# Patient Record
Sex: FEMALE | Race: WHITE | NOT HISPANIC OR LATINO | Employment: UNEMPLOYED | URBAN - METROPOLITAN AREA
[De-identification: names, ages, dates, MRNs, and addresses within clinical notes are randomized per-mention and may not be internally consistent; named-entity substitution may affect disease eponyms.]

---

## 2017-04-28 ENCOUNTER — HOSPITAL ENCOUNTER (EMERGENCY)
Facility: HOSPITAL | Age: 27
End: 2017-05-02
Attending: EMERGENCY MEDICINE | Admitting: EMERGENCY MEDICINE
Payer: COMMERCIAL

## 2017-04-28 ENCOUNTER — APPOINTMENT (EMERGENCY)
Dept: RADIOLOGY | Facility: HOSPITAL | Age: 27
End: 2017-04-28
Payer: COMMERCIAL

## 2017-04-28 DIAGNOSIS — F19.94 SUBSTANCE INDUCED MOOD DISORDER (HCC): Primary | ICD-10-CM

## 2017-04-28 LAB
ALBUMIN SERPL BCP-MCNC: 4.1 G/DL (ref 3.5–5)
ALP SERPL-CCNC: 56 U/L (ref 46–116)
ALT SERPL W P-5'-P-CCNC: 44 U/L (ref 12–78)
AMPHETAMINES SERPL QL SCN: NEGATIVE
ANION GAP SERPL CALCULATED.3IONS-SCNC: 17 MMOL/L (ref 4–13)
APTT PPP: 27 SECONDS (ref 23–35)
AST SERPL W P-5'-P-CCNC: 78 U/L (ref 5–45)
BARBITURATES UR QL: NEGATIVE
BASOPHILS # BLD AUTO: 0 THOUSANDS/ΜL (ref 0–0.1)
BASOPHILS NFR BLD AUTO: 0 % (ref 0–1)
BENZODIAZ UR QL: NEGATIVE
BILIRUB DIRECT SERPL-MCNC: 0.1 MG/DL (ref 0–0.2)
BILIRUB SERPL-MCNC: 0.3 MG/DL (ref 0.2–1)
BILIRUB UR QL STRIP: NEGATIVE
BUN SERPL-MCNC: 8 MG/DL (ref 5–25)
CALCIUM SERPL-MCNC: 8.7 MG/DL (ref 8.3–10.1)
CHLORIDE SERPL-SCNC: 103 MMOL/L (ref 100–108)
CLARITY UR: CLEAR
CO2 SERPL-SCNC: 22 MMOL/L (ref 21–32)
COCAINE UR QL: POSITIVE
COLOR UR: ABNORMAL
CREAT SERPL-MCNC: 0.68 MG/DL (ref 0.6–1.3)
EOSINOPHIL # BLD AUTO: 0 THOUSAND/ΜL (ref 0–0.61)
EOSINOPHIL NFR BLD AUTO: 0 % (ref 0–6)
ERYTHROCYTE [DISTWIDTH] IN BLOOD BY AUTOMATED COUNT: 14.5 % (ref 11.6–15.1)
ETHANOL SERPL-MCNC: 253 MG/DL (ref 0–3)
GFR SERPL CREATININE-BSD FRML MDRD: >60 ML/MIN/1.73SQ M
GLUCOSE SERPL-MCNC: 81 MG/DL (ref 65–140)
GLUCOSE UR STRIP-MCNC: NEGATIVE MG/DL
HCG UR QL: NORMAL
HCT VFR BLD AUTO: 40.7 % (ref 37–47)
HGB BLD-MCNC: 13.4 G/DL (ref 12–16)
HGB UR QL STRIP.AUTO: ABNORMAL
INR PPP: 1.05 (ref 0.86–1.16)
KETONES UR STRIP-MCNC: NEGATIVE MG/DL
LEUKOCYTE ESTERASE UR QL STRIP: NEGATIVE
LIPASE SERPL-CCNC: 80 U/L (ref 73–393)
LYMPHOCYTES # BLD AUTO: 2.4 THOUSANDS/ΜL (ref 0.6–4.47)
LYMPHOCYTES NFR BLD AUTO: 20 % (ref 14–44)
MCH RBC QN AUTO: 30.3 PG (ref 27–31)
MCHC RBC AUTO-ENTMCNC: 32.8 G/DL (ref 31.4–37.4)
MCV RBC AUTO: 92 FL (ref 82–98)
METHADONE UR QL: NEGATIVE
MONOCYTES # BLD AUTO: 1 THOUSAND/ΜL (ref 0.17–1.22)
MONOCYTES NFR BLD AUTO: 8 % (ref 4–12)
NEUTROPHILS # BLD AUTO: 8.9 THOUSANDS/ΜL (ref 1.85–7.62)
NEUTS SEG NFR BLD AUTO: 72 % (ref 43–75)
NITRITE UR QL STRIP: NEGATIVE
NON-SQ EPI CELLS URNS QL MICRO: ABNORMAL /HPF
NRBC BLD AUTO-RTO: 0 /100 WBCS
OPIATES UR QL SCN: NEGATIVE
PCP UR QL: NEGATIVE
PH UR STRIP.AUTO: 5.5 [PH] (ref 5–9)
PLATELET # BLD AUTO: 355 THOUSANDS/UL (ref 130–400)
PMV BLD AUTO: 6.8 FL (ref 8.9–12.7)
POTASSIUM SERPL-SCNC: 2.9 MMOL/L (ref 3.5–5.3)
PROT SERPL-MCNC: 7.7 G/DL (ref 6.4–8.2)
PROT UR STRIP-MCNC: NEGATIVE MG/DL
PROTHROMBIN TIME: 11 SECONDS (ref 9.4–11.7)
RBC # BLD AUTO: 4.42 MILLION/UL (ref 4.2–5.4)
RBC #/AREA URNS AUTO: ABNORMAL /HPF
SODIUM SERPL-SCNC: 142 MMOL/L (ref 136–145)
SP GR UR STRIP.AUTO: <=1.005 (ref 1–1.03)
THC UR QL: POSITIVE
UROBILINOGEN UR QL STRIP.AUTO: 0.2 E.U./DL
WBC # BLD AUTO: 12.4 THOUSAND/UL (ref 4.8–10.8)
WBC #/AREA URNS AUTO: ABNORMAL /HPF

## 2017-04-28 PROCEDURE — 80048 BASIC METABOLIC PNL TOTAL CA: CPT | Performed by: EMERGENCY MEDICINE

## 2017-04-28 PROCEDURE — 80320 DRUG SCREEN QUANTALCOHOLS: CPT | Performed by: EMERGENCY MEDICINE

## 2017-04-28 PROCEDURE — 85610 PROTHROMBIN TIME: CPT | Performed by: EMERGENCY MEDICINE

## 2017-04-28 PROCEDURE — 83690 ASSAY OF LIPASE: CPT | Performed by: EMERGENCY MEDICINE

## 2017-04-28 PROCEDURE — 80076 HEPATIC FUNCTION PANEL: CPT | Performed by: EMERGENCY MEDICINE

## 2017-04-28 PROCEDURE — 72125 CT NECK SPINE W/O DYE: CPT

## 2017-04-28 PROCEDURE — 96365 THER/PROPH/DIAG IV INF INIT: CPT

## 2017-04-28 PROCEDURE — 70450 CT HEAD/BRAIN W/O DYE: CPT

## 2017-04-28 PROCEDURE — 85730 THROMBOPLASTIN TIME PARTIAL: CPT | Performed by: EMERGENCY MEDICINE

## 2017-04-28 PROCEDURE — 81025 URINE PREGNANCY TEST: CPT | Performed by: EMERGENCY MEDICINE

## 2017-04-28 PROCEDURE — 80307 DRUG TEST PRSMV CHEM ANLYZR: CPT | Performed by: EMERGENCY MEDICINE

## 2017-04-28 PROCEDURE — 36415 COLL VENOUS BLD VENIPUNCTURE: CPT | Performed by: EMERGENCY MEDICINE

## 2017-04-28 PROCEDURE — 96372 THER/PROPH/DIAG INJ SC/IM: CPT

## 2017-04-28 PROCEDURE — 71260 CT THORAX DX C+: CPT

## 2017-04-28 PROCEDURE — 81001 URINALYSIS AUTO W/SCOPE: CPT | Performed by: EMERGENCY MEDICINE

## 2017-04-28 PROCEDURE — 85025 COMPLETE CBC W/AUTO DIFF WBC: CPT | Performed by: EMERGENCY MEDICINE

## 2017-04-28 PROCEDURE — 96361 HYDRATE IV INFUSION ADD-ON: CPT

## 2017-04-28 PROCEDURE — 74177 CT ABD & PELVIS W/CONTRAST: CPT

## 2017-04-28 RX ORDER — HALOPERIDOL 5 MG/ML
5 INJECTION INTRAMUSCULAR ONCE
Status: COMPLETED | OUTPATIENT
Start: 2017-04-28 | End: 2017-04-28

## 2017-04-28 RX ORDER — POTASSIUM CHLORIDE 14.9 MG/ML
20 INJECTION INTRAVENOUS ONCE
Status: COMPLETED | OUTPATIENT
Start: 2017-04-28 | End: 2017-04-29

## 2017-04-28 RX ORDER — LORAZEPAM 2 MG/ML
2 INJECTION INTRAMUSCULAR ONCE
Status: COMPLETED | OUTPATIENT
Start: 2017-04-28 | End: 2017-04-28

## 2017-04-28 RX ADMIN — POTASSIUM CHLORIDE 20 MEQ: 200 INJECTION, SOLUTION INTRAVENOUS at 23:20

## 2017-04-28 RX ADMIN — IOHEXOL 100 ML: 350 INJECTION, SOLUTION INTRAVENOUS at 19:49

## 2017-04-28 RX ADMIN — LORAZEPAM 2 MG: 2 INJECTION INTRAMUSCULAR; INTRAVENOUS at 17:57

## 2017-04-28 RX ADMIN — SODIUM CHLORIDE 125 ML/HR: 0.9 INJECTION, SOLUTION INTRAVENOUS at 23:30

## 2017-04-28 RX ADMIN — SODIUM CHLORIDE 1000 ML: 0.9 INJECTION, SOLUTION INTRAVENOUS at 18:57

## 2017-04-28 RX ADMIN — HALOPERIDOL LACTATE 5 MG: 5 INJECTION, SOLUTION INTRAMUSCULAR at 17:57

## 2017-04-29 PROCEDURE — 96366 THER/PROPH/DIAG IV INF ADDON: CPT

## 2017-04-29 RX ORDER — SODIUM CHLORIDE 9 MG/ML
1000 INJECTION, SOLUTION INTRAVENOUS ONCE
Status: DISCONTINUED | OUTPATIENT
Start: 2017-04-29 | End: 2017-04-29

## 2017-04-29 RX ORDER — CLONAZEPAM 0.5 MG/1
1 TABLET ORAL 3 TIMES DAILY
Status: DISCONTINUED | OUTPATIENT
Start: 2017-04-29 | End: 2017-05-02 | Stop reason: HOSPADM

## 2017-04-29 RX ORDER — SODIUM CHLORIDE 9 MG/ML
125 INJECTION, SOLUTION INTRAVENOUS CONTINUOUS
Status: DISCONTINUED | OUTPATIENT
Start: 2017-04-29 | End: 2017-05-02 | Stop reason: HOSPADM

## 2017-04-29 RX ORDER — SERTRALINE HYDROCHLORIDE 100 MG/1
150 TABLET, FILM COATED ORAL DAILY
COMMUNITY
End: 2018-05-18

## 2017-04-29 RX ORDER — GABAPENTIN 300 MG/1
600 CAPSULE ORAL 3 TIMES DAILY
COMMUNITY
End: 2019-03-11 | Stop reason: DRUGHIGH

## 2017-04-29 RX ORDER — CLONAZEPAM 1 MG/1
1 TABLET ORAL 3 TIMES DAILY
COMMUNITY
End: 2018-05-18

## 2017-04-29 RX ORDER — POTASSIUM CHLORIDE 20 MEQ/1
40 TABLET, EXTENDED RELEASE ORAL ONCE
Status: COMPLETED | OUTPATIENT
Start: 2017-04-29 | End: 2017-04-29

## 2017-04-29 RX ADMIN — CLONAZEPAM 1 MG: 0.5 TABLET ORAL at 09:51

## 2017-04-29 RX ADMIN — CLONAZEPAM 1 MG: 0.5 TABLET ORAL at 21:01

## 2017-04-29 RX ADMIN — GABAPENTIN 800 MG: 300 CAPSULE ORAL at 09:52

## 2017-04-29 RX ADMIN — GABAPENTIN 800 MG: 300 CAPSULE ORAL at 21:02

## 2017-04-29 RX ADMIN — GABAPENTIN 800 MG: 300 CAPSULE ORAL at 16:24

## 2017-04-29 RX ADMIN — POTASSIUM CHLORIDE 40 MEQ: 1500 TABLET, EXTENDED RELEASE ORAL at 09:51

## 2017-04-29 RX ADMIN — SERTRALINE HYDROCHLORIDE 150 MG: 50 TABLET ORAL at 09:51

## 2017-04-29 RX ADMIN — CLONAZEPAM 1 MG: 0.5 TABLET ORAL at 16:24

## 2017-04-30 RX ORDER — NICOTINE 21 MG/24HR
21 PATCH, TRANSDERMAL 24 HOURS TRANSDERMAL ONCE
Status: COMPLETED | OUTPATIENT
Start: 2017-04-30 | End: 2017-05-02

## 2017-04-30 RX ORDER — ZOLPIDEM TARTRATE 5 MG/1
5 TABLET ORAL
Status: DISCONTINUED | OUTPATIENT
Start: 2017-04-30 | End: 2017-05-02 | Stop reason: HOSPADM

## 2017-04-30 RX ORDER — LORAZEPAM 1 MG/1
1 TABLET ORAL ONCE
Status: COMPLETED | OUTPATIENT
Start: 2017-04-30 | End: 2017-04-30

## 2017-04-30 RX ORDER — NICOTINE 21 MG/24HR
14 PATCH, TRANSDERMAL 24 HOURS TRANSDERMAL ONCE
Status: COMPLETED | OUTPATIENT
Start: 2017-04-30 | End: 2017-05-01

## 2017-04-30 RX ADMIN — GABAPENTIN 800 MG: 300 CAPSULE ORAL at 09:22

## 2017-04-30 RX ADMIN — SERTRALINE HYDROCHLORIDE 150 MG: 50 TABLET ORAL at 09:22

## 2017-04-30 RX ADMIN — ZOLPIDEM TARTRATE 5 MG: 5 TABLET, COATED ORAL at 23:09

## 2017-04-30 RX ADMIN — CLONAZEPAM 1 MG: 0.5 TABLET ORAL at 15:48

## 2017-04-30 RX ADMIN — GABAPENTIN 800 MG: 300 CAPSULE ORAL at 20:22

## 2017-04-30 RX ADMIN — GABAPENTIN 800 MG: 300 CAPSULE ORAL at 16:07

## 2017-04-30 RX ADMIN — NICOTINE 14 MG: 14 PATCH TRANSDERMAL at 18:05

## 2017-04-30 RX ADMIN — NICOTINE 21 MG: 21 PATCH, EXTENDED RELEASE TRANSDERMAL at 14:37

## 2017-04-30 RX ADMIN — CLONAZEPAM 1 MG: 0.5 TABLET ORAL at 20:22

## 2017-04-30 RX ADMIN — CLONAZEPAM 1 MG: 0.5 TABLET ORAL at 09:22

## 2017-04-30 RX ADMIN — LORAZEPAM 1 MG: 1 TABLET ORAL at 20:22

## 2017-04-30 RX ADMIN — LORAZEPAM 1 MG: 1 TABLET ORAL at 22:08

## 2017-05-01 VITALS
WEIGHT: 145 LBS | DIASTOLIC BLOOD PRESSURE: 63 MMHG | OXYGEN SATURATION: 99 % | SYSTOLIC BLOOD PRESSURE: 112 MMHG | HEART RATE: 71 BPM | RESPIRATION RATE: 18 BRPM | TEMPERATURE: 98.3 F

## 2017-05-01 LAB — POTASSIUM SERPL-SCNC: 4 MMOL/L (ref 3.5–5.3)

## 2017-05-01 PROCEDURE — 84132 ASSAY OF SERUM POTASSIUM: CPT | Performed by: EMERGENCY MEDICINE

## 2017-05-01 PROCEDURE — 93005 ELECTROCARDIOGRAM TRACING: CPT | Performed by: EMERGENCY MEDICINE

## 2017-05-01 PROCEDURE — 36415 COLL VENOUS BLD VENIPUNCTURE: CPT | Performed by: EMERGENCY MEDICINE

## 2017-05-01 RX ORDER — LORAZEPAM 1 MG/1
2 TABLET ORAL ONCE
Status: COMPLETED | OUTPATIENT
Start: 2017-05-01 | End: 2017-05-01

## 2017-05-01 RX ORDER — HYDROXYZINE HYDROCHLORIDE 25 MG/1
50 TABLET, FILM COATED ORAL ONCE
Status: COMPLETED | OUTPATIENT
Start: 2017-05-01 | End: 2017-05-01

## 2017-05-01 RX ORDER — NICOTINE 21 MG/24HR
21 PATCH, TRANSDERMAL 24 HOURS TRANSDERMAL ONCE
Status: DISCONTINUED | OUTPATIENT
Start: 2017-05-01 | End: 2017-05-02 | Stop reason: HOSPADM

## 2017-05-01 RX ORDER — HYDROXYZINE HYDROCHLORIDE 50 MG/ML
50 INJECTION, SOLUTION INTRAMUSCULAR ONCE
Status: DISCONTINUED | OUTPATIENT
Start: 2017-05-01 | End: 2017-05-01

## 2017-05-01 RX ADMIN — NICOTINE 21 MG: 21 PATCH, EXTENDED RELEASE TRANSDERMAL at 09:14

## 2017-05-01 RX ADMIN — GABAPENTIN 800 MG: 300 CAPSULE ORAL at 15:42

## 2017-05-01 RX ADMIN — CLONAZEPAM 1 MG: 0.5 TABLET ORAL at 15:43

## 2017-05-01 RX ADMIN — CLONAZEPAM 1 MG: 0.5 TABLET ORAL at 20:46

## 2017-05-01 RX ADMIN — SERTRALINE HYDROCHLORIDE 150 MG: 50 TABLET ORAL at 08:39

## 2017-05-01 RX ADMIN — CLONAZEPAM 1 MG: 0.5 TABLET ORAL at 08:06

## 2017-05-01 RX ADMIN — GABAPENTIN 800 MG: 300 CAPSULE ORAL at 20:46

## 2017-05-01 RX ADMIN — LORAZEPAM 2 MG: 1 TABLET ORAL at 18:27

## 2017-05-01 RX ADMIN — LORAZEPAM 2 MG: 1 TABLET ORAL at 05:01

## 2017-05-01 RX ADMIN — GABAPENTIN 800 MG: 300 CAPSULE ORAL at 08:05

## 2017-05-01 RX ADMIN — NICOTINE 21 MG: 21 PATCH, EXTENDED RELEASE TRANSDERMAL at 09:19

## 2017-05-01 RX ADMIN — ZOLPIDEM TARTRATE 5 MG: 5 TABLET, COATED ORAL at 22:56

## 2017-05-01 RX ADMIN — HYDROXYZINE HYDROCHLORIDE 50 MG: 25 TABLET, FILM COATED ORAL at 10:56

## 2017-05-02 PROCEDURE — 99285 EMERGENCY DEPT VISIT HI MDM: CPT

## 2017-05-05 LAB
ATRIAL RATE: 66 BPM
P AXIS: -1 DEGREES
PR INTERVAL: 140 MS
QRS AXIS: 63 DEGREES
QRSD INTERVAL: 92 MS
QT INTERVAL: 398 MS
QTC INTERVAL: 417 MS
T WAVE AXIS: 39 DEGREES
VENTRICULAR RATE: 66 BPM

## 2018-05-18 ENCOUNTER — HOSPITAL ENCOUNTER (EMERGENCY)
Facility: HOSPITAL | Age: 28
Discharge: HOME | End: 2018-05-18
Attending: EMERGENCY MEDICINE | Admitting: EMERGENCY MEDICINE
Payer: COMMERCIAL

## 2018-05-18 VITALS
HEART RATE: 58 BPM | WEIGHT: 170 LBS | BODY MASS INDEX: 27.32 KG/M2 | DIASTOLIC BLOOD PRESSURE: 72 MMHG | OXYGEN SATURATION: 92 % | SYSTOLIC BLOOD PRESSURE: 109 MMHG | HEIGHT: 66 IN | TEMPERATURE: 97.5 F | RESPIRATION RATE: 16 BRPM

## 2018-05-18 DIAGNOSIS — T50.901A DRUG OVERDOSE: Primary | ICD-10-CM

## 2018-05-18 LAB
ATRIAL RATE: 69 BPM
P AXIS: 55 DEGREES
PR INTERVAL: 166 MS
QRS AXIS: 37 DEGREES
QRSD INTERVAL: 88 MS
QT INTERVAL: 422 MS
QTC INTERVAL: 452 MS
T WAVE AXIS: 34 DEGREES
VENTRICULAR RATE: 69 BPM

## 2018-05-18 PROCEDURE — 93010 ELECTROCARDIOGRAM REPORT: CPT | Performed by: INTERNAL MEDICINE

## 2018-05-18 PROCEDURE — 96375 TX/PRO/DX INJ NEW DRUG ADDON: CPT

## 2018-05-18 PROCEDURE — 93005 ELECTROCARDIOGRAM TRACING: CPT

## 2018-05-18 PROCEDURE — 96374 THER/PROPH/DIAG INJ IV PUSH: CPT

## 2018-05-18 PROCEDURE — 99285 EMERGENCY DEPT VISIT HI MDM: CPT

## 2018-05-18 PROCEDURE — 96376 TX/PRO/DX INJ SAME DRUG ADON: CPT

## 2018-05-18 RX ORDER — CHLORPROMAZINE HYDROCHLORIDE 200 MG/1
200 TABLET, FILM COATED ORAL
COMMUNITY

## 2018-05-18 RX ORDER — NALOXONE HYDROCHLORIDE 0.4 MG/ML
1 INJECTION, SOLUTION INTRAMUSCULAR; INTRAVENOUS; SUBCUTANEOUS ONCE
Status: COMPLETED | OUTPATIENT
Start: 2018-05-18 | End: 2018-05-18

## 2018-05-18 RX ORDER — NALOXONE HYDROCHLORIDE 0.4 MG/ML
0.4 INJECTION, SOLUTION INTRAMUSCULAR; INTRAVENOUS; SUBCUTANEOUS ONCE
Status: COMPLETED | OUTPATIENT
Start: 2018-05-18 | End: 2018-05-18

## 2018-05-18 RX ORDER — ONDANSETRON 2 MG/ML
4 INJECTION INTRAMUSCULAR; INTRAVENOUS ONCE
Status: COMPLETED | OUTPATIENT
Start: 2018-05-18 | End: 2018-05-18

## 2018-05-18 RX ORDER — HYDROXYZINE PAMOATE 50 MG/1
50 CAPSULE ORAL EVERY EVENING
COMMUNITY
End: 2019-03-11 | Stop reason: ALTCHOICE

## 2018-05-18 RX ORDER — VENLAFAXINE 100 MG/1
175 TABLET ORAL DAILY
COMMUNITY
End: 2018-07-11

## 2018-05-18 RX ORDER — TRAZODONE HYDROCHLORIDE 100 MG/1
200 TABLET ORAL
COMMUNITY

## 2018-05-18 RX ORDER — NALOXONE HYDROCHLORIDE 0.4 MG/ML
0.4 INJECTION, SOLUTION INTRAMUSCULAR; INTRAVENOUS; SUBCUTANEOUS ONCE
Status: DISCONTINUED | OUTPATIENT
Start: 2018-05-18 | End: 2018-05-18 | Stop reason: HOSPADM

## 2018-05-18 RX ADMIN — NALOXONE HYDROCHLORIDE 0.4 MG: 0.4 INJECTION, SOLUTION INTRAMUSCULAR; INTRAVENOUS; SUBCUTANEOUS at 16:51

## 2018-05-18 RX ADMIN — ONDANSETRON 4 MG: 2 INJECTION INTRAMUSCULAR; INTRAVENOUS at 16:51

## 2018-05-18 RX ADMIN — NALOXONE HYDROCHLORIDE 0.4 MG: 0.4 INJECTION, SOLUTION INTRAMUSCULAR; INTRAVENOUS; SUBCUTANEOUS at 14:20

## 2018-05-18 NOTE — ED PROVIDER NOTES
History  Chief Complaint   Patient presents with    Heroin Overdose - Accidental     States she was clean for 11 months and went to her first intake appointment today and someone offered her free heroin so she used 2 bags   Per medic patient responded after police gave Narcan intranasally, medic attempted one IV stick but was unsuccessful and patient refused further attempts, asking to leave on arrival        Patient presents after overdose on heroin  Patient was revived with nasal narcan by police  Patient was clean for 11 months was at rehab today when someone offered her free heroin  She took two bags  No suicidal or homicidal ideations  History provided by:  Patient   used: No        Prior to Admission Medications   Prescriptions Last Dose Informant Patient Reported? Taking?   chlorproMAZINE (THORAZINE) 200 mg tablet   Yes Yes   Sig: Take 200 mg by mouth Medrol Dose Pack scheduling ONLY   gabapentin (NEURONTIN) 300 mg capsule  Self Yes No   Sig: Take 600 mg by mouth 3 (three) times a day     hydrOXYzine pamoate (VISTARIL) 50 mg capsule  Self Yes Yes   Sig: Take 50 mg by mouth every evening   traZODone (DESYREL) 100 mg tablet   Yes Yes   Sig: Take 200 mg by mouth daily at bedtime   venlafaxine (EFFEXOR) 100 MG tablet  Self Yes Yes   Sig: Take 175 mg by mouth daily      Facility-Administered Medications: None       Past Medical History:   Diagnosis Date    Psychiatric disorder        History reviewed  No pertinent surgical history  History reviewed  No pertinent family history  I have reviewed and agree with the history as documented  Social History   Substance Use Topics    Smoking status: Current Every Day Smoker    Smokeless tobacco: Never Used    Alcohol use Yes        Review of Systems   All other systems reviewed and are negative  Physical Exam  Physical Exam   Constitutional: She is oriented to person, place, and time  No distress     HENT:   Mouth/Throat: Oropharynx is clear and moist    Eyes: Pupils are equal, round, and reactive to light  Neck: Normal range of motion  Cardiovascular: Normal rate, regular rhythm and intact distal pulses  Pulmonary/Chest: Effort normal and breath sounds normal  No respiratory distress  Abdominal: Soft  There is no tenderness  Musculoskeletal: Normal range of motion  Neurological: She is alert and oriented to person, place, and time  Skin: Capillary refill takes less than 2 seconds  She is not diaphoretic  Nursing note and vitals reviewed  Vital Signs  ED Triage Vitals [05/18/18 1402]   Temperature Pulse Respirations Blood Pressure SpO2   97 5 °F (36 4 °C) 64 16 109/59 (!) 85 %      Temp Source Heart Rate Source Patient Position - Orthostatic VS BP Location FiO2 (%)   Oral Monitor Lying Left arm --      Pain Score       No Pain           Vitals:    05/18/18 1445 05/18/18 1500 05/18/18 1515 05/18/18 1530   BP: 117/74 106/66 106/64 121/60   Pulse:    66   Patient Position - Orthostatic VS:           Visual Acuity      ED Medications  Medications    EMS REPLENISHMENT MED (not administered)   naloxone (NARCAN) injection 0 4 mg (not administered)   naloxone (NARCAN) injection 0 4 mg (0 4 mg Intravenous Given 5/18/18 1420)   naloxone (NARCAN) injection 1 mg (0 4 mg Intravenous Given 5/18/18 1651)   ondansetron (ZOFRAN) injection 4 mg (4 mg Intravenous Given 5/18/18 1651)       Diagnostic Studies  Results Reviewed     None                 No orders to display              Procedures  Procedures       Phone Contacts  ED Phone Contact    ED Course                               MDM  Number of Diagnoses or Management Options  Drug overdose:   Diagnosis management comments: Pulse ox 97% on RA indicating adequate oxygenation    Given another dose of narcan on arrival as she was still lethargic with shallow breathing  [facility] came to see the patient   Patient was also screened by [facility] since she was at there program today  Recommended discharge  Will await father who is coming to pick the patient up  Dad at bedside to take the patient at home  Patient Progress  Patient progress: stable    CritCare Time    Disposition  Final diagnoses:   None     ED Disposition     None      Follow-up Information    None         Patient's Medications   Discharge Prescriptions    No medications on file     No discharge procedures on file      ED Provider  Electronically Signed by           Alejandro De Anda DO  05/19/18 7723

## 2018-05-18 NOTE — ED PROCEDURE NOTE
PROCEDURE  ECG 12 Lead Documentation  Date/Time: 5/18/2018 2:19 PM  Performed by: Mercy Rodrigez  Authorized by: Skyler GASPAR     ECG reviewed by me, the ED Provider: yes    Patient location:  ED  Interpretation:     Interpretation: normal    Rate:     ECG rate:  69    ECG rate assessment: normal    Rhythm:     Rhythm: sinus rhythm    Ectopy:     Ectopy: none    QRS:     QRS axis:  Normal    QRS intervals:  Normal  Conduction:     Conduction: normal    ST segments:     ST segments:  Normal  T waves:     T waves: normal           Solis Samuel DO  05/18/18 1419

## 2018-05-18 NOTE — PROGRESS NOTES
Pt is a 31 yo DWF who presented to ER via ambulance and paramedics after injecting 2 bags of heroin at home - pt was given narcan prior to presenting to ER  [facility] requested PES assess this pt who had her intake at the [facility] this morning  Stressors: "boredom" and relapse of heroin today after 11 months of sobriety  Mood is "ok" now and has been recently  Sxs include: energy level is "mild"; anxiety about once per day - "leg goes (shakes); palms are sweaty; pit in stomach; grinds teeth"; etoh use from age 15 with a hx of abuse - last use was about 3 weeks ago - 4 x LOCO's; heroin use from age 16 - used 2 bags today - IV and pt had been clean x 11 months; cannabis use from age 15 with last use yesterday and using about every other day; hx of crack cocaine use from age 16 with last use about 11 months ago  Pt denies: all lethality; psychosis; paranoia; any withdrawal risk; any change with sleep/appetite/concentration  Collateral with pt's father, Jelani Barrier [phone]: "Pt had been doing great until today; pt has been attending program; mood has been cheerful and happy; pt has been helping around the house  Pt was past-out downstairs - turning purple; BF was at home visiting pt - pt was given narcan; pt said she got it at group"

## 2018-05-18 NOTE — ED NOTES
Pt falling asleep easily, attempting to eat a cookie and falling asleep while bringing cookie to her mouth    Wakes easily to verbal stimuli     Ike Posada RN  05/18/18 3493

## 2018-05-19 NOTE — DISCHARGE INSTRUCTIONS
Adult Overdose   WHAT YOU NEED TO KNOW:   An overdose occurs when you take more medicine than is safe to take  An overdose may be mild, or it may be a life-threatening emergency  You may feel drowsy, dizzy, or nauseated, depending on what medicine you took  No specific harm was found to your body as a result of your overdose  Your symptoms have decreased over the last 6 to 12 hours  DISCHARGE INSTRUCTIONS:   Call 911 if you or someone close to you has any of the following symptoms:   · Your face is very pale and clammy to the touch  · Your body is limp or you are unable to speak  · You cannot be awakened  · Your breathing is slower or faster than usual      · Your heart is beating slower than usual     · You feel confused or more tired than usual, or you are sweating more than normal     · Your speech is slurred  · Your fingernails or lips are blue or purple  Return to the emergency department if:   · You have severe nausea and vomiting  · You cannot have a bowel movement or urinate  · Your skin and the whites of your eyes turn yellow  Contact your healthcare provider if:   · You think your medicine is not working  · You have nausea, vomiting, diarrhea, or abdominal cramps  · You have questions or concerns about your medicine  Take your medicine as directed:  Contact your healthcare provider if you think your medicine is not helping or if you have side effects  Do not take more medicine that is prescribed  Keep your medicines in the original containers  Keep a list of the medicines, vitamins, and herbs you take  Include the amounts, and when and why you take them  Do not share your medicine with others  Prevent another overdose:   · Read labels carefully  Read the labels of all the medicines that you take  Never take more than the label says to take  If you have questions, ask your pharmacist or healthcare provider  · Do not drink alcohol    Alcohol increases your risk for another overdose  Alcohol can also hide important symptoms that you need to call your healthcare provider for  · Do not drive or operate machinery  until your healthcare provider says it is okay  These activities may be dangerous after an overdose  · Use caution if you take more than one medicine at a time  Mixing medicines or taking more than one medicine at a time can be dangerous  · Tell your family or friends what medicines you are taking  Talk with them about what to do if you have an overdose  Follow up with your healthcare provider as directed: You may need to see a counselor or psychiatrist  Write down your questions so you remember to ask them during your visits  © 2017 [facility] Information is for End User's use only and may not be sold, redistributed or otherwise used for commercial purposes  All illustrations and images included in CareNotes® are the copyrighted property of [employer]  or [employer]  The above information is an  only  It is not intended as medical advice for individual conditions or treatments  Talk to your doctor, nurse or pharmacist before following any medical regimen to see if it is safe and effective for you

## 2018-07-11 ENCOUNTER — HOSPITAL ENCOUNTER (EMERGENCY)
Facility: HOSPITAL | Age: 28
Discharge: HOME | End: 2018-07-11
Attending: EMERGENCY MEDICINE | Admitting: EMERGENCY MEDICINE
Payer: COMMERCIAL

## 2018-07-11 ENCOUNTER — APPOINTMENT (EMERGENCY)
Dept: RADIOLOGY | Facility: HOSPITAL | Age: 28
End: 2018-07-11
Payer: COMMERCIAL

## 2018-07-11 VITALS
SYSTOLIC BLOOD PRESSURE: 121 MMHG | HEIGHT: 66 IN | TEMPERATURE: 98.5 F | WEIGHT: 155 LBS | DIASTOLIC BLOOD PRESSURE: 71 MMHG | BODY MASS INDEX: 24.91 KG/M2 | OXYGEN SATURATION: 98 % | RESPIRATION RATE: 18 BRPM | HEART RATE: 88 BPM

## 2018-07-11 DIAGNOSIS — Z3A.01 LESS THAN 8 WEEKS GESTATION OF PREGNANCY: Primary | ICD-10-CM

## 2018-07-11 LAB
ANION GAP SERPL CALCULATED.3IONS-SCNC: 9 MMOL/L (ref 4–13)
B-HCG SERPL-ACNC: 49 MIU/ML
BACTERIA UR QL AUTO: ABNORMAL /HPF
BASOPHILS # BLD AUTO: 0.03 THOUSANDS/ΜL (ref 0–0.1)
BASOPHILS NFR BLD AUTO: 0 % (ref 0–1)
BILIRUB UR QL STRIP: ABNORMAL
BUN SERPL-MCNC: 7 MG/DL (ref 5–25)
CALCIUM SERPL-MCNC: 9 MG/DL (ref 8.3–10.1)
CHLORIDE SERPL-SCNC: 104 MMOL/L (ref 100–108)
CLARITY UR: ABNORMAL
CO2 SERPL-SCNC: 24 MMOL/L (ref 21–32)
COLOR UR: YELLOW
CREAT SERPL-MCNC: 0.64 MG/DL (ref 0.6–1.3)
EOSINOPHIL # BLD AUTO: 0.12 THOUSAND/ΜL (ref 0–0.61)
EOSINOPHIL NFR BLD AUTO: 2 % (ref 0–6)
ERYTHROCYTE [DISTWIDTH] IN BLOOD BY AUTOMATED COUNT: 13.6 % (ref 11.6–15.1)
EXT PREG TEST URINE: ABNORMAL
GFR SERPL CREATININE-BSD FRML MDRD: 122 ML/MIN/1.73SQ M
GLUCOSE SERPL-MCNC: 90 MG/DL (ref 65–140)
GLUCOSE UR STRIP-MCNC: NEGATIVE MG/DL
HCT VFR BLD AUTO: 37.9 % (ref 34.8–46.1)
HGB BLD-MCNC: 12.7 G/DL (ref 11.5–15.4)
HGB UR QL STRIP.AUTO: NEGATIVE
IMM GRANULOCYTES # BLD AUTO: 0.02 THOUSAND/UL (ref 0–0.2)
IMM GRANULOCYTES NFR BLD AUTO: 0 % (ref 0–2)
KETONES UR STRIP-MCNC: ABNORMAL MG/DL
LEUKOCYTE ESTERASE UR QL STRIP: ABNORMAL
LYMPHOCYTES # BLD AUTO: 2.33 THOUSANDS/ΜL (ref 0.6–4.47)
LYMPHOCYTES NFR BLD AUTO: 30 % (ref 14–44)
MCH RBC QN AUTO: 31.4 PG (ref 26.8–34.3)
MCHC RBC AUTO-ENTMCNC: 33.5 G/DL (ref 31.4–37.4)
MCV RBC AUTO: 94 FL (ref 82–98)
MONOCYTES # BLD AUTO: 0.83 THOUSAND/ΜL (ref 0.17–1.22)
MONOCYTES NFR BLD AUTO: 11 % (ref 4–12)
NEUTROPHILS # BLD AUTO: 4.52 THOUSANDS/ΜL (ref 1.85–7.62)
NEUTS SEG NFR BLD AUTO: 57 % (ref 43–75)
NITRITE UR QL STRIP: NEGATIVE
NON-SQ EPI CELLS URNS QL MICRO: ABNORMAL /HPF
NRBC BLD AUTO-RTO: 0 /100 WBCS
OTHER STN SPEC: ABNORMAL
PH UR STRIP.AUTO: 7 [PH] (ref 5–9)
PLATELET # BLD AUTO: 307 THOUSANDS/UL (ref 149–390)
PMV BLD AUTO: 8.6 FL (ref 8.9–12.7)
POTASSIUM SERPL-SCNC: 3.9 MMOL/L (ref 3.5–5.3)
PROT UR STRIP-MCNC: ABNORMAL MG/DL
RBC # BLD AUTO: 4.05 MILLION/UL (ref 3.81–5.12)
RBC #/AREA URNS AUTO: ABNORMAL /HPF
SODIUM SERPL-SCNC: 137 MMOL/L (ref 136–145)
SP GR UR STRIP.AUTO: 1.02 (ref 1–1.03)
UROBILINOGEN UR QL STRIP.AUTO: 1 E.U./DL
WBC # BLD AUTO: 7.85 THOUSAND/UL (ref 4.31–10.16)
WBC #/AREA URNS AUTO: ABNORMAL /HPF

## 2018-07-11 PROCEDURE — 99284 EMERGENCY DEPT VISIT MOD MDM: CPT

## 2018-07-11 PROCEDURE — 96361 HYDRATE IV INFUSION ADD-ON: CPT

## 2018-07-11 PROCEDURE — 36415 COLL VENOUS BLD VENIPUNCTURE: CPT | Performed by: PHYSICIAN ASSISTANT

## 2018-07-11 PROCEDURE — 85025 COMPLETE CBC W/AUTO DIFF WBC: CPT | Performed by: PHYSICIAN ASSISTANT

## 2018-07-11 PROCEDURE — 76815 OB US LIMITED FETUS(S): CPT

## 2018-07-11 PROCEDURE — 84702 CHORIONIC GONADOTROPIN TEST: CPT | Performed by: PHYSICIAN ASSISTANT

## 2018-07-11 PROCEDURE — 80048 BASIC METABOLIC PNL TOTAL CA: CPT | Performed by: PHYSICIAN ASSISTANT

## 2018-07-11 PROCEDURE — 96360 HYDRATION IV INFUSION INIT: CPT

## 2018-07-11 PROCEDURE — 81001 URINALYSIS AUTO W/SCOPE: CPT | Performed by: PHYSICIAN ASSISTANT

## 2018-07-11 PROCEDURE — 81025 URINE PREGNANCY TEST: CPT | Performed by: PHYSICIAN ASSISTANT

## 2018-07-11 RX ORDER — SERTRALINE HYDROCHLORIDE 100 MG/1
100 TABLET, FILM COATED ORAL DAILY
COMMUNITY
End: 2019-03-11 | Stop reason: DRUGHIGH

## 2018-07-11 RX ADMIN — SODIUM CHLORIDE 1000 ML: 0.9 INJECTION, SOLUTION INTRAVENOUS at 13:52

## 2018-07-11 NOTE — ED PROVIDER NOTES
History  Chief Complaint   Patient presents with    Abdominal Pain     low abdominal pain x 2 days,period 2-3 weeks late,,nauseated,no diarrhea     28 y/o female presenting with nausea, vomiting and abdominal cramping and pain x 2 days  3 weeks late on menses  She has had approximately 12 episodes of vomiting  Concerned that she is pregnant  States that she has taken pregnancy tests that were negative  G4PO with 4 elective abortions  Denies diarrhea, constipation, shortness breath, wheezing, chest pain, vaginal bleeding or discharge, calf pain or swelling, lightheadedness, fatigue  Prior to Admission Medications   Prescriptions Last Dose Informant Patient Reported? Taking?   chlorproMAZINE (THORAZINE) 200 mg tablet   Yes No   Sig: Take 200 mg by mouth Medrol Dose Pack scheduling ONLY   gabapentin (NEURONTIN) 300 mg capsule  Self Yes No   Sig: Take 600 mg by mouth 3 (three) times a day     hydrOXYzine pamoate (VISTARIL) 50 mg capsule  Self Yes No   Sig: Take 50 mg by mouth every evening   sertraline (ZOLOFT) 100 mg tablet   Yes Yes   Sig: Take 100 mg by mouth daily   traZODone (DESYREL) 100 mg tablet   Yes No   Sig: Take 200 mg by mouth daily at bedtime      Facility-Administered Medications: None       Past Medical History:   Diagnosis Date    Psychiatric disorder     Bipolar 1 ,borderline personality disorder       History reviewed  No pertinent surgical history  History reviewed  No pertinent family history  I have reviewed and agree with the history as documented  Social History   Substance Use Topics    Smoking status: Current Every Day Smoker     Packs/day: 1 00    Smokeless tobacco: Never Used    Alcohol use Yes      Comment: occas        Review of Systems   Constitutional: Negative  Negative for activity change and appetite change  HENT: Negative  Eyes: Negative  Respiratory: Negative  Cardiovascular: Negative      Gastrointestinal: Positive for abdominal pain, nausea and vomiting  Negative for abdominal distention, anal bleeding, blood in stool, constipation, diarrhea and rectal pain  Genitourinary: Negative  Musculoskeletal: Negative  Skin: Negative  Neurological: Negative  All other systems reviewed and are negative  Physical Exam  Physical Exam   Constitutional: She is oriented to person, place, and time  She appears well-developed and well-nourished  HENT:   Head: Normocephalic and atraumatic  Eyes: Conjunctivae are normal    Neck: Normal range of motion  Neck supple  Cardiovascular: Normal rate, regular rhythm, normal heart sounds and intact distal pulses  Exam reveals no gallop and no friction rub  No murmur heard  Pulmonary/Chest: Effort normal and breath sounds normal  No respiratory distress  She has no wheezes  She has no rales  She exhibits no tenderness  S PO2 is 98% indicating adequate oxygenation  Abdominal: Soft  Bowel sounds are normal  She exhibits no distension and no mass  There is tenderness  There is no rebound and no guarding  No hernia  Minimal lower abdominal tenderness without rebound guarding or peritoneal sign  Neurological: She is alert and oriented to person, place, and time  Skin: Skin is warm and dry  Capillary refill takes less than 2 seconds  Nursing note and vitals reviewed        Vital Signs  ED Triage Vitals [07/11/18 1259]   Temperature Pulse Respirations Blood Pressure SpO2   98 7 °F (37 1 °C) 86 16 119/74 98 %      Temp Source Heart Rate Source Patient Position - Orthostatic VS BP Location FiO2 (%)   Tympanic Monitor Sitting Right arm --      Pain Score       9           Vitals:    07/11/18 1259   BP: 119/74   Pulse: 86   Patient Position - Orthostatic VS: Sitting       Visual Acuity      ED Medications  Medications   sodium chloride 0 9 % bolus 1,000 mL (1,000 mL Intravenous New Bag 7/11/18 1352)       Diagnostic Studies  Results Reviewed     Procedure Component Value Units Date/Time    Basic metabolic panel [[id number]] Collected:  07/11/18 1351    Lab Status:  Final result Specimen:  Blood from Arm, Left Updated:  07/11/18 1428     Sodium 137 mmol/L      Potassium 3 9 mmol/L      Chloride 104 mmol/L      CO2 24 mmol/L      Anion Gap 9 mmol/L      BUN 7 mg/dL      Creatinine 0 64 mg/dL      Glucose 90 mg/dL      Calcium 9 0 mg/dL      eGFR 122 ml/min/1 73sq m     Narrative:         National Kidney Disease Education Program recommendations are as follows:  GFR calculation is accurate only with a steady state creatinine  Chronic Kidney disease less than 60 ml/min/1 73 sq  meters  Kidney failure less than 15 ml/min/1 73 sq  meters      hCG, quantitative [[id number]]  (Abnormal) Collected:  07/11/18 1351    Lab Status:  Final result Specimen:  Blood from Arm, Left Updated:  07/11/18 1428     HCG, Quant 49 (H) mIU/mL     Narrative:          Expected Ranges:     Approximate               Approximate HCG  Gestation age          Concentration ( mIU/mL)  _____________          ______________________   Nayla Gant                      HCG values  0 2-1                       5-50  1-2                           2-3                         100-5000  3-4                         500-38664  4-5                         1000-09337  5-6                         [id number]  6-8                         [id number]  8-12                        [id number]    CBC and differential [[id number]]  (Abnormal) Collected:  07/11/18 1351    Lab Status:  Final result Specimen:  Blood from Arm, Left Updated:  07/11/18 1400     WBC 7 85 Thousand/uL      RBC 4 05 Million/uL      Hemoglobin 12 7 g/dL      Hematocrit 37 9 %      MCV 94 fL      MCH 31 4 pg      MCHC 33 5 g/dL      RDW 13 6 %      MPV 8 6 (L) fL      Platelets 908 Thousands/uL      nRBC 0 /100 WBCs      Neutrophils Relative 57 %      Immat GRANS % 0 %      Lymphocytes Relative 30 %      Monocytes Relative 11 %      Eosinophils Relative 2 %      Basophils Relative 0 % Neutrophils Absolute 4 52 Thousands/µL      Immature Grans Absolute 0 02 Thousand/uL      Lymphocytes Absolute 2 33 Thousands/µL      Monocytes Absolute 0 83 Thousand/µL      Eosinophils Absolute 0 12 Thousand/µL      Basophils Absolute 0 03 Thousands/µL     Urine Microscopic [[id number]]  (Abnormal) Collected:  18    Lab Status:  Final result Specimen:  Urine from Urine, Clean Catch Updated:  18     RBC, UA 0-1 (A) /hpf      WBC, UA 10-20 (A) /hpf      Epithelial Cells Innumerable (A) /hpf      Bacteria, UA Moderate (A) /hpf      OTHER OBSERVATIONS Trichomonas Organisms Present    UA w Reflex to Microscopic [[id number]]  (Abnormal) Collected:  18    Lab Status:  Edited Result - FINAL Specimen:  Urine from Urine, Clean Catch Updated:  18     Color, UA Yellow     Clarity, UA Cloudy     Specific Gravity, UA 1 025     pH, UA 7 0     Leukocytes, UA Moderate (A)     Nitrite, UA Negative     Protein, UA Trace (A) mg/dl      Glucose, UA Negative mg/dl      Ketones, UA Trace (A) mg/dl      Urobilinogen, UA 1 0 E U /dl      Bilirubin, UA Interference- unable to analyze (A)     Blood, UA Negative    POCT pregnancy, urine [[id number]]  (Abnormal) Resulted:  18    Lab Status:  Final result Updated:  18     EXT PREG TEST UR (Ref: Negative) positive (+)                 US OB pregnancy limited with transvaginal   Final Result by Mauricio Centeno MD ( 145)   No intrauterine gestation or suspicious adnexal mass identified  Rounded subtle hypoechoic region in the right ovary with peripheral blood flow is probably corpus luteum  Close follow-up suggested  Differential remains early IUP, spontaneous  and ectopic pregnancy  Correlate with serial quantitative BHCG              Workstation performed: [id number]                    Procedures  Procedures       Phone Contacts  ED Phone Contact    ED Course  ED Course as of  1505      1357 LM with ulrasound                                 MDM  Number of Diagnoses or Management Options  Diagnosis management comments: Discussed with patient the results of the imaging studies and lab rests  Will need repeat bhcg in 2 days to recheck as US is nonconclusive and patient is very early gestation    She does have an appointment with OBGYN at [facility]  Patient tolerating fluids  Will prescribe reglan and have her f/u with obgyn as she will need re-evaluation of her psych meds  Encouraged prenatal use  Patient is informed to return to the emergency department for worsening of symptoms and was given proper education regarding their diagnosis and symptoms  Otherwise the patient is informed to follow up with their primary care doctor for re-evaluation  The patient verbalizes understanding and agrees with above assessment and plan  All questions were answered  Please Note: Fluency Direct voice recognition software may have been used in the creation of this document  Wrong words or sound a like substitutions may have occurred due to the inherent limitations of the voice software  Amount and/or Complexity of Data Reviewed  Clinical lab tests: ordered and reviewed  Tests in the radiology section of CPT®: ordered and reviewed  Review and summarize past medical records: yes  Independent visualization of images, tracings, or specimens: yes      CritCare Time    Disposition  Final diagnoses:   Less than 8 weeks gestation of pregnancy     Time reflects when diagnosis was documented in both MDM as applicable and the Disposition within this note     Time User Action Codes Description Comment    7/11/2018  3:05 PM Vern Izquierdo Add [Y6P 89] Less than 8 weeks gestation of pregnancy       ED Disposition     ED Disposition Condition Comment    Discharge  Robert Favre discharge to home/self care      Condition at discharge: Good        Follow-up Information     Follow up With Specialties Details Why Contact Info Additional [address] Emergency Department Emergency Medicine Go to If symptoms worsen [address] [facility]  [phone] [facility] ED, [place], [place], [address]    Ana Guzman MD Family Medicine  As needed, otherwise please go to your appointment with your OBGYN for re-evaluation  [address] [facility]  [phone]             Patient's Medications   Discharge Prescriptions    No medications on file     No discharge procedures on file      ED Provider  Electronically Signed by           Phil Carbone PA-C  07/11/18 5164

## 2018-07-12 ENCOUNTER — TELEPHONE (OUTPATIENT)
Dept: ADMINISTRATIVE | Facility: OTHER | Age: 28
End: 2018-07-12

## 2018-07-12 NOTE — TELEPHONE ENCOUNTER
ATTEMPTED TO CALL PATIENT MAIL BOX IS FULL - MAILING ED EDC LETTER W/INSTRUCTIONS TO FOLLOW UP WITH RUFINO     AND KATHLEEN BADILLO ONLY IF NEED TO -  FYI - PATIENT HAS AN NEW PATIENT APPT FOR A PE ON 7/16/18

## 2018-07-16 ENCOUNTER — HOSPITAL ENCOUNTER (EMERGENCY)
Facility: HOSPITAL | Age: 28
Discharge: HOME | End: 2018-07-16
Attending: EMERGENCY MEDICINE | Admitting: EMERGENCY MEDICINE
Payer: COMMERCIAL

## 2018-07-16 ENCOUNTER — APPOINTMENT (EMERGENCY)
Dept: RADIOLOGY | Facility: HOSPITAL | Age: 28
End: 2018-07-16
Payer: COMMERCIAL

## 2018-07-16 VITALS
WEIGHT: 183.38 LBS | DIASTOLIC BLOOD PRESSURE: 67 MMHG | SYSTOLIC BLOOD PRESSURE: 109 MMHG | BODY MASS INDEX: 29.6 KG/M2 | TEMPERATURE: 98.6 F | RESPIRATION RATE: 18 BRPM | OXYGEN SATURATION: 97 % | HEART RATE: 73 BPM

## 2018-07-16 DIAGNOSIS — O20.0 THREATENED MISCARRIAGE: Primary | ICD-10-CM

## 2018-07-16 DIAGNOSIS — N39.0 URINARY TRACT INFECTION: ICD-10-CM

## 2018-07-16 LAB
ABO GROUP BLD: NORMAL
B-HCG SERPL-ACNC: 389 MIU/ML
BACTERIA UR QL AUTO: ABNORMAL /HPF
BASOPHILS # BLD AUTO: 0.04 THOUSANDS/ΜL (ref 0–0.1)
BASOPHILS NFR BLD AUTO: 0 % (ref 0–1)
BILIRUB UR QL STRIP: NEGATIVE
CLARITY UR: ABNORMAL
COLOR UR: ABNORMAL
EOSINOPHIL # BLD AUTO: 0.06 THOUSAND/ΜL (ref 0–0.61)
EOSINOPHIL NFR BLD AUTO: 1 % (ref 0–6)
ERYTHROCYTE [DISTWIDTH] IN BLOOD BY AUTOMATED COUNT: 13.5 % (ref 11.6–15.1)
EXT PREG TEST URINE: POSITIVE
GLUCOSE UR STRIP-MCNC: NEGATIVE MG/DL
HCT VFR BLD AUTO: 38.2 % (ref 34.8–46.1)
HGB BLD-MCNC: 13 G/DL (ref 11.5–15.4)
HGB UR QL STRIP.AUTO: NEGATIVE
IMM GRANULOCYTES # BLD AUTO: 0.03 THOUSAND/UL (ref 0–0.2)
IMM GRANULOCYTES NFR BLD AUTO: 0 % (ref 0–2)
KETONES UR STRIP-MCNC: NEGATIVE MG/DL
LEUKOCYTE ESTERASE UR QL STRIP: ABNORMAL
LYMPHOCYTES # BLD AUTO: 3.03 THOUSANDS/ΜL (ref 0.6–4.47)
LYMPHOCYTES NFR BLD AUTO: 31 % (ref 14–44)
MCH RBC QN AUTO: 31.5 PG (ref 26.8–34.3)
MCHC RBC AUTO-ENTMCNC: 34 G/DL (ref 31.4–37.4)
MCV RBC AUTO: 93 FL (ref 82–98)
MONOCYTES # BLD AUTO: 0.77 THOUSAND/ΜL (ref 0.17–1.22)
MONOCYTES NFR BLD AUTO: 8 % (ref 4–12)
NEUTROPHILS # BLD AUTO: 5.94 THOUSANDS/ΜL (ref 1.85–7.62)
NEUTS SEG NFR BLD AUTO: 60 % (ref 43–75)
NITRITE UR QL STRIP: NEGATIVE
NON-SQ EPI CELLS URNS QL MICRO: ABNORMAL /HPF
NRBC BLD AUTO-RTO: 0 /100 WBCS
PH UR STRIP.AUTO: 6 [PH] (ref 5–9)
PLATELET # BLD AUTO: 361 THOUSANDS/UL (ref 149–390)
PMV BLD AUTO: 8.7 FL (ref 8.9–12.7)
PROT UR STRIP-MCNC: NEGATIVE MG/DL
RBC # BLD AUTO: 4.13 MILLION/UL (ref 3.81–5.12)
RBC #/AREA URNS AUTO: ABNORMAL /HPF
RH BLD: POSITIVE
SP GR UR STRIP.AUTO: >=1.03 (ref 1–1.03)
UROBILINOGEN UR QL STRIP.AUTO: 0.2 E.U./DL
WBC # BLD AUTO: 9.87 THOUSAND/UL (ref 4.31–10.16)
WBC #/AREA URNS AUTO: ABNORMAL /HPF

## 2018-07-16 PROCEDURE — 99284 EMERGENCY DEPT VISIT MOD MDM: CPT

## 2018-07-16 PROCEDURE — 81025 URINE PREGNANCY TEST: CPT

## 2018-07-16 PROCEDURE — 76815 OB US LIMITED FETUS(S): CPT

## 2018-07-16 PROCEDURE — 87591 N.GONORRHOEAE DNA AMP PROB: CPT | Performed by: EMERGENCY MEDICINE

## 2018-07-16 PROCEDURE — 86901 BLOOD TYPING SEROLOGIC RH(D): CPT | Performed by: EMERGENCY MEDICINE

## 2018-07-16 PROCEDURE — 85025 COMPLETE CBC W/AUTO DIFF WBC: CPT | Performed by: EMERGENCY MEDICINE

## 2018-07-16 PROCEDURE — 87086 URINE CULTURE/COLONY COUNT: CPT

## 2018-07-16 PROCEDURE — 86900 BLOOD TYPING SEROLOGIC ABO: CPT | Performed by: EMERGENCY MEDICINE

## 2018-07-16 PROCEDURE — 84702 CHORIONIC GONADOTROPIN TEST: CPT | Performed by: EMERGENCY MEDICINE

## 2018-07-16 PROCEDURE — 36415 COLL VENOUS BLD VENIPUNCTURE: CPT | Performed by: EMERGENCY MEDICINE

## 2018-07-16 PROCEDURE — 81001 URINALYSIS AUTO W/SCOPE: CPT

## 2018-07-16 PROCEDURE — 87491 CHLMYD TRACH DNA AMP PROBE: CPT | Performed by: EMERGENCY MEDICINE

## 2018-07-16 RX ORDER — CEPHALEXIN 500 MG/1
500 CAPSULE ORAL EVERY 6 HOURS SCHEDULED
Qty: 20 CAPSULE | Refills: 0 | Status: SHIPPED | OUTPATIENT
Start: 2018-07-16 | End: 2018-07-21

## 2018-07-16 RX ORDER — FLUCONAZOLE 100 MG/1
200 TABLET ORAL ONCE
Status: DISCONTINUED | OUTPATIENT
Start: 2018-07-16 | End: 2018-07-16

## 2018-07-16 NOTE — ED NOTES
As per Maddie Lang from pharmacy, the medication Fluconazole should be avoided during the first trimester  Medication held  Dr Patricio Du notified  Will continue to monitor        Andria Lucas RN  07/16/18 1225

## 2018-07-16 NOTE — ED NOTES
US will be here at [address]  Patient updated  Will continue to monitor        Antelmo Mao, DEANGELO  07/16/18 4558

## 2018-07-16 NOTE — ED PROVIDER NOTES
History  Chief Complaint   Patient presents with    Abdominal Pain     Cherietn states hse is 3 weeks pregnant and is having lower abd craming since      Patient is a 80-year-old female  She is a  5 para 0  She was seen here 5 days ago for lower abdominal cramping  She was found to be pregnant  Her quantitative beta HCG was only 49  There was no IUP on ultrasound  It did show a corpus luteum cyst   Patient continues to experience lower abdominal cramping  No vaginal bleeding  She has not had a repeat quantitative beta HCG done  Symptoms are moderate in intensity  No aggravating or relieving factors  There has been no syncope  Prior to Admission Medications   Prescriptions Last Dose Informant Patient Reported? Taking?   chlorproMAZINE (THORAZINE) 200 mg tablet 7/15/2018 at 2130  Yes Yes   Sig: Take 200 mg by mouth Medrol Dose Pack scheduling ONLY   gabapentin (NEURONTIN) 300 mg capsule 7/15/2018 at 2130 Self Yes Yes   Sig: Take 600 mg by mouth 3 (three) times a day     hydrOXYzine pamoate (VISTARIL) 50 mg capsule 2018 at 0930 Self Yes Yes   Sig: Take 50 mg by mouth every evening   sertraline (ZOLOFT) 100 mg tablet 2018 at 0930  Yes Yes   Sig: Take 100 mg by mouth daily   traZODone (DESYREL) 100 mg tablet 7/15/2018 at 2130  Yes Yes   Sig: Take 200 mg by mouth daily at bedtime      Facility-Administered Medications: None       Past Medical History:   Diagnosis Date    Psychiatric disorder     Bipolar 1 ,borderline personality disorder       History reviewed  No pertinent surgical history  History reviewed  No pertinent family history  I have reviewed and agree with the history as documented  Social History   Substance Use Topics    Smoking status: Current Every Day Smoker     Packs/day: 1 00    Smokeless tobacco: Never Used    Alcohol use Yes      Comment: occas        Review of Systems   Constitutional: Negative for chills and fever     HENT: Negative for rhinorrhea and sore throat  Eyes: Negative for pain, redness and visual disturbance  Respiratory: Negative for cough and shortness of breath  Cardiovascular: Negative for chest pain and leg swelling  Gastrointestinal: Positive for abdominal pain  Negative for diarrhea and vomiting  Endocrine: Negative for polydipsia and polyuria  Genitourinary: Negative for dysuria, frequency, hematuria, vaginal bleeding and vaginal discharge  Musculoskeletal: Negative for back pain and neck pain  Skin: Negative for rash and wound  Allergic/Immunologic: Negative for immunocompromised state  Neurological: Negative for weakness, numbness and headaches  Hematological: Does not bruise/bleed easily  Psychiatric/Behavioral: Negative for hallucinations and suicidal ideas  All other systems reviewed and are negative  Physical Exam  Physical Exam   Constitutional: She is oriented to person, place, and time  She appears well-developed and well-nourished  No distress  HENT:   Head: Normocephalic and atraumatic  Mouth/Throat: Oropharynx is clear and moist    Eyes: Conjunctivae are normal  Right eye exhibits no discharge  Left eye exhibits no discharge  No scleral icterus  Neck: Normal range of motion  Neck supple  Cardiovascular: Normal rate, regular rhythm, normal heart sounds and intact distal pulses  Exam reveals no gallop and no friction rub  No murmur heard  Pulmonary/Chest: Effort normal and breath sounds normal  No stridor  No respiratory distress  She has no wheezes  She has no rales  Abdominal: Soft  Bowel sounds are normal  She exhibits no distension  There is no tenderness  There is no rebound and no guarding  Musculoskeletal: Normal range of motion  She exhibits no edema, tenderness or deformity  No CVA tenderness  No calf tenderness  Neurological: She is alert and oriented to person, place, and time  She has normal strength  No sensory deficit  GCS eye subscore is 4   GCS verbal subscore is 5  GCS motor subscore is 6  Skin: Skin is warm and dry  No rash noted  She is not diaphoretic  Psychiatric: She has a normal mood and affect  Her behavior is normal    Vitals reviewed  Vital Signs  ED Triage Vitals [07/16/18 1608]   Temperature Pulse Respirations Blood Pressure SpO2   98 6 °F (37 °C) 72 16 117/68 98 %      Temp Source Heart Rate Source Patient Position - Orthostatic VS BP Location FiO2 (%)   Tympanic Monitor Sitting Right arm --      Pain Score       6           Vitals:    07/16/18 1608 07/16/18 2028   BP: 117/68 109/67   Pulse: 72 73   Patient Position - Orthostatic VS: Sitting Lying       Visual Acuity      ED Medications  Medications - No data to display    Diagnostic Studies  Results Reviewed     Procedure Component Value Units Date/Time    Chlamydia/GC amplified DNA by PCR [[id number]] Collected:  07/16/18 1855    Lab Status:   In process Specimen:  Urine from Urine, Other Updated:  07/16/18 1858    POCT pregnancy, urine [[id number]]  (Normal) Resulted:  07/16/18 1636    Lab Status:  Edited Result - FINAL Updated:  07/16/18 1736     EXT PREG TEST UR (Ref: Negative) Positive    Quantitative hCG [[id number]]  (Abnormal) Collected:  07/16/18 1659    Lab Status:  Final result Specimen:  Blood from Hand, Left Updated:  07/16/18 1725     HCG, Quant 389 (H) mIU/mL     Narrative:          Expected Ranges:     Approximate               Approximate HCG  Gestation age          Concentration ( mIU/mL)  _____________          ______________________   Arcelia Jayden                      HCG values  0 2-1                       5-50  1-2                           2-3                         100-5000  3-4                         500-49199  4-5                         1000-01846  5-6                         [id number]  6-8                         40019-550342  8-12                        [id number]    CBC and differential [[id number]]  (Abnormal) Collected:  07/16/18 1659    Lab Status:  Final result Specimen:  Blood from Hand, Left Updated:  18 1704     WBC 9 87 Thousand/uL      RBC 4 13 Million/uL      Hemoglobin 13 0 g/dL      Hematocrit 38 2 %      MCV 93 fL      MCH 31 5 pg      MCHC 34 0 g/dL      RDW 13 5 %      MPV 8 7 (L) fL      Platelets 456 Thousands/uL      nRBC 0 /100 WBCs      Neutrophils Relative 60 %      Immat GRANS % 0 %      Lymphocytes Relative 31 %      Monocytes Relative 8 %      Eosinophils Relative 1 %      Basophils Relative 0 %      Neutrophils Absolute 5 94 Thousands/µL      Immature Grans Absolute 0 03 Thousand/uL      Lymphocytes Absolute 3 03 Thousands/µL      Monocytes Absolute 0 77 Thousand/µL      Eosinophils Absolute 0 06 Thousand/µL      Basophils Absolute 0 04 Thousands/µL     Urine Microscopic [[id number]]  (Abnormal) Collected:  18    Lab Status:  Final result Specimen:  Urine from Urine, Clean Catch Updated:  18 1649     RBC, UA 2-4 (A) /hpf      WBC, UA 10-20 (A) /hpf      Epithelial Cells Occasional /hpf      Bacteria, UA Moderate (A) /hpf     Urine culture [[id number]] Collected:  18    Lab Status: In process Specimen:  Urine from Urine, Clean Catch Updated:  18 1649    UA w Reflex to Microscopic w Reflex to Culture [[id number]]  (Abnormal) Collected:  18    Lab Status:  Final result Specimen:  Urine from Urine, Clean Catch Updated:  18     Color, UA Light Yellow     Clarity, UA Slightly Cloudy     Specific Gravity, UA >=1 030     pH, UA 6 0     Leukocytes, UA Moderate (A)     Nitrite, UA Negative     Protein, UA Negative mg/dl      Glucose, UA Negative mg/dl      Ketones, UA Negative mg/dl      Urobilinogen, UA 0 2 E U /dl      Bilirubin, UA Negative     Blood, UA Negative                 US OB pregnancy limited with transvaginal   Final Result by Wilfred Cheng MD (2036)      No intrauterine gestation or adnexal mass identified     Differential remains early IUP, spontaneous  and ectopic pregnancy  Follow-up with serial beta hCG and pelvic/OB ultrasound recommended in 1-2 weeks or less if clinical symptoms worsen  Workstation performed: [id number]                    Procedures  Procedures       Phone Contacts  ED Phone Contact    ED Course                               MDM  Number of Diagnoses or Management Options  Diagnosis management comments: The quantitative beta HCGs are doubling appropriately  This might represent a very early pregnancy  If this was a spontaneous , the number should be decreasing, and they are not  An ectopic is still in the differential, but this would be less likely as a numbers are doubling appropriately  No IUP was seen on ultrasound  This is not surprising as the beta HCG is less than 1500  At this point patient is appropriate for continued outpatient management  Will do repeat beta HCG in 48 hr   Recommended follow up with OBGYN  Return if abdominal pain, vaginal bleeding or condition worsens  Patient also appears to have a urinary tract infection  Will cover with antibiotics         Amount and/or Complexity of Data Reviewed  Clinical lab tests: ordered and reviewed  Tests in the radiology section of CPT®: ordered and reviewed  Review and summarize past medical records: yes      CritCare Time    Disposition  Final diagnoses:   Threatened miscarriage   Urinary tract infection     Time reflects when diagnosis was documented in both MDM as applicable and the Disposition within this note     Time User Action Codes Description Comment    2018  9:07 PM Darlynn Herb Add [O03 9] Spontaneous miscarriage     2018  9:07 PM Darlynn Herb Remove [O03 9] Spontaneous miscarriage     2018  9:07 PM Darlynn Herb Add [O20 0] Threatened miscarriage     2018  9:07 PM Darlynn Herb Add [N39 0] Urinary tract infection       ED Disposition     ED Disposition Condition Comment    Discharge  [facility] discharge to home/self care     Condition at discharge: Good        Follow-up Information     Follow up With Specialties Details Why Contact Info    Vanessa Saravia MD Obstetrics and Gynecology, Obstetrics, Gynecology In 3 days  [address] [place] [address] [address]  [phone]            Patient's Medications   Discharge Prescriptions    CEPHALEXIN (KEFLEX) 500 MG CAPSULE    Take 1 capsule (500 mg total) by mouth every 6 (six) hours for 5 days       Start Date: 7/16/2018 End Date: 7/21/2018       Order Dose: 500 mg       Quantity: 20 capsule    Refills: 0    MICONAZOLE (MONISTAT 7) 100 MG VAGINAL SUPPOSITORY    Insert 1 suppository (100 mg total) into the vagina daily at bedtime       Start Date: 7/16/2018 End Date: --       Order Dose: 100 mg       Quantity: 7 suppository    Refills: 0       Outpatient Discharge Orders  hCG, quantitative   Standing Status: Future  Standing Exp   Date: 07/20/18         ED Provider  Electronically Signed by           Apollo Cervantes MD  07/16/18 2111       Apollo Cervantes MD  07/16/18 2112

## 2018-07-17 LAB — BACTERIA UR CULT: NORMAL

## 2018-07-17 NOTE — ED NOTES
Dr Eugenia Romo notified of patient results  Patient resting comfortably  Will continue to monitor        Teresa Ness RN  07/16/18 2051

## 2018-07-17 NOTE — ED NOTES
PAtient resting comfortably  Patient states that her friend brought her food and now she feels much better  Patient instructed not to have anything by mouth until all results are back  Will continue to monitor        Kathy Bates RN  07/16/18 2030

## 2018-07-17 NOTE — DISCHARGE INSTRUCTIONS
Threatened Miscarriage   WHAT YOU NEED TO KNOW:   A threatened miscarriage occurs when you have vaginal bleeding within the first 20 weeks of pregnancy  It means that a miscarriage may happen  A threatened miscarriage may also be called a threatened   DISCHARGE INSTRUCTIONS:   Return to the emergency department if:   · You feel weak or faint  · Your pain or cramping in your abdomen or back gets worse  · You have vaginal bleeding that soaks 1 or more pads in an hour  · You pass material that looks like tissue or large clots  Contact your healthcare provider or obstetrician if:   · You have a fever  · You have trouble urinating, burning when you urinate, or feel a need to urinate often  · You have new or worsening vaginal bleeding  · You have vaginal pain or itching, or vaginal discharge that is yellow, green, or foul-smelling  · You have questions or concerns about your condition or care  Self-care: The following may help you manage your symptoms and decrease your risk for a miscarriage:  · Do not put anything in your vagina  Do not have sex, douche, or use tampons  These actions may increase your risk for infection and miscarriage  · Rest as directed  Do not exercise or do strenuous activities  These activities may cause  labor or miscarriage  Ask your healthcare provider what activities are okay to do  Stay healthy during pregnancy:   · Eat a variety of healthy foods  Healthy foods can help you get extra protein, water, and calories that you need while you are pregnant  Healthy foods include fruits, vegetables, whole-grain breads, low-fat dairy products, beans, lean meats, and fish  Avoid raw or undercooked meat and fish  Ask your healthcare provider if you need a special diet  · Take prenatal vitamins as directed  These help you get the right amount of vitamins and minerals  They may also decrease the risk of certain birth defects      · Do not drink alcohol or use illegal drugs  These can increase your risk for a miscarriage or harm your baby  · Do not smoke  Nicotine and other chemicals in cigarettes and cigars can harm your baby and cause miscarriage or  labor  Ask your healthcare provider for information if you currently smoke and need help to quit  E-cigarettes or smokeless tobacco still contain nicotine  Do not use these products  · Decrease your risk for an infection  Always wash your hands before eating or preparing meals  Do not spend time with people who are sick  Ask your healthcare provider if you need immunizations such as the flu or hepatitis B vaccine  Immunizations may decrease your risk for infections that could cause a miscarriage  · Manage your medical conditions  Keep your blood pressure and blood sugars under control  Maintain a healthy weight during pregnancy  Follow up with your obstetrician as directed: You may need to see your obstetrician frequently for ultrasounds or blood tests  Write down your questions so you remember to ask them during your visits  ©  26[address] [employer] Information is for End User's use only and may not be sold, redistributed or otherwise used for commercial purposes  All illustrations and images included in CareNotes® are the copyrighted property of [employer]  or [employer]  The above information is an  only  It is not intended as medical advice for individual conditions or treatments  Talk to your doctor, nurse or pharmacist before following any medical regimen to see if it is safe and effective for you  Urinary Tract Infection in Pregnancy   WHAT YOU NEED TO KNOW:   A urinary tract infection (UTI) is caused by bacteria that get inside your urinary tract  The urinary tract includes your kidneys and bladder  UTIs are common in women, especially during pregnancy  This is because of changes in your immune system, hormones, and uterus   As your uterus grows, your bladder may not completely empty  Bacteria can grow in the urine left in your bladder and cause a UTI  UTIs during pregnancy can increase your risk for a kidney infection and  labor  DISCHARGE INSTRUCTIONS:   Return to the emergency department if:   · You are urinating very little or not at all  · You have severe pain  · You have a fever and chills  Contact your healthcare provider if:   · You have pain in the sides of your back  · You do not feel better after 2 days of treatment  · You are vomiting  · You have questions or concerns about your condition or care  Medicines:   · Antibiotics  help fight a bacterial infection  · Medicines  may be given to decrease pain and burning when you urinate  They will also help decrease the feeling that you need to urinate often  These medicines will make your urine orange or red  · Take your medicine as directed  Contact your healthcare provider if you think your medicine is not helping or if you have side effects  Tell him or her if you are allergic to any medicine  Keep a list of the medicines, vitamins, and herbs you take  Include the amounts, and when and why you take them  Bring the list or the pill bottles to follow-up visits  Carry your medicine list with you in case of an emergency  Prevent another UTI:   · Urinate when you feel the urge  Do not hold your urine  Urinate as soon as needed  Always urinate after you have sex  This helps flush out bacteria passed during sex  · Drink liquids as directed  Ask how much liquid to drink each day and which liquids are best for you  You may need to drink more fluids than usual to help flush bacteria out of your urinary tract  Do not drink caffeine or carbonated liquids  These drinks can irritate your bladder  Your healthcare provider may recommend cranberry juice to help prevent a UTI  · Wipe from front to back after you urinate or have a bowel movement    This will help prevent germs from getting into your urinary tract through your urethra  · Do pelvic muscle exercises often  Pelvic muscle exercises may help you start and stop urinating  Strong pelvic muscles may help you empty your bladder easier  Squeeze these muscles tightly for 5 seconds like you are trying to hold back urine  Then relax for 5 seconds  Gradually work up to squeezing for 10 seconds  Do 3 sets of 15 repetitions a day, or as directed  Follow up with your healthcare provider as directed: You may need to return for more urine tests  Write down your questions so you remember to ask them during your visits  © 2017 [employer] Information is for End User's use only and may not be sold, redistributed or otherwise used for commercial purposes  All illustrations and images included in CareNotes® are the copyrighted property of [employer]  or [employer]  The above information is an  only  It is not intended as medical advice for individual conditions or treatments  Talk to your doctor, nurse or pharmacist before following any medical regimen to see if it is safe and effective for you

## 2018-07-18 ENCOUNTER — VBI (OUTPATIENT)
Dept: ADMINISTRATIVE | Facility: OTHER | Age: 28
End: 2018-07-18

## 2018-07-18 ENCOUNTER — TRANSCRIBE ORDERS (OUTPATIENT)
Dept: LAB | Facility: CLINIC | Age: 28
End: 2018-07-18

## 2018-07-18 ENCOUNTER — APPOINTMENT (OUTPATIENT)
Dept: LAB | Facility: CLINIC | Age: 28
End: 2018-07-18
Payer: COMMERCIAL

## 2018-07-18 DIAGNOSIS — O20.0 THREATENED MISCARRIAGE: ICD-10-CM

## 2018-07-18 LAB
B-HCG SERPL-ACNC: 848 MIU/ML
CHLAMYDIA DNA CVX QL NAA+PROBE: NORMAL
N GONORRHOEA DNA GENITAL QL NAA+PROBE: NORMAL

## 2018-07-18 PROCEDURE — 84702 CHORIONIC GONADOTROPIN TEST: CPT

## 2018-07-18 PROCEDURE — 36415 COLL VENOUS BLD VENIPUNCTURE: CPT

## 2018-07-27 ENCOUNTER — HOSPITAL ENCOUNTER (EMERGENCY)
Facility: HOSPITAL | Age: 28
Discharge: HOME | End: 2018-07-28
Attending: EMERGENCY MEDICINE | Admitting: EMERGENCY MEDICINE
Payer: COMMERCIAL

## 2018-07-27 VITALS
HEART RATE: 126 BPM | RESPIRATION RATE: 20 BRPM | OXYGEN SATURATION: 98 % | DIASTOLIC BLOOD PRESSURE: 75 MMHG | TEMPERATURE: 98.6 F | SYSTOLIC BLOOD PRESSURE: 133 MMHG

## 2018-07-27 DIAGNOSIS — F10.929 ALCOHOLIC INTOXICATION WITH COMPLICATION (HCC): Primary | ICD-10-CM

## 2018-07-27 LAB
ALBUMIN SERPL BCP-MCNC: 3.8 G/DL (ref 3.5–5)
ALP SERPL-CCNC: 55 U/L (ref 46–116)
ALT SERPL W P-5'-P-CCNC: 18 U/L (ref 12–78)
AMPHETAMINES SERPL QL SCN: NEGATIVE
ANION GAP SERPL CALCULATED.3IONS-SCNC: 13 MMOL/L (ref 4–13)
AST SERPL W P-5'-P-CCNC: 19 U/L (ref 5–45)
B-HCG SERPL-ACNC: ABNORMAL MIU/ML
BARBITURATES UR QL: NEGATIVE
BASOPHILS # BLD AUTO: 0.07 THOUSANDS/ΜL (ref 0–0.1)
BASOPHILS NFR BLD AUTO: 1 % (ref 0–1)
BENZODIAZ UR QL: NEGATIVE
BILIRUB SERPL-MCNC: 0.2 MG/DL (ref 0.2–1)
BILIRUB UR QL STRIP: NEGATIVE
BUN SERPL-MCNC: 1 MG/DL (ref 5–25)
CALCIUM SERPL-MCNC: 9.2 MG/DL (ref 8.3–10.1)
CHLORIDE SERPL-SCNC: 106 MMOL/L (ref 100–108)
CLARITY UR: CLEAR
CO2 SERPL-SCNC: 23 MMOL/L (ref 21–32)
COCAINE UR QL: NEGATIVE
COLOR UR: NORMAL
CREAT SERPL-MCNC: 0.64 MG/DL (ref 0.6–1.3)
EOSINOPHIL # BLD AUTO: 0.02 THOUSAND/ΜL (ref 0–0.61)
EOSINOPHIL NFR BLD AUTO: 0 % (ref 0–6)
ERYTHROCYTE [DISTWIDTH] IN BLOOD BY AUTOMATED COUNT: 13.4 % (ref 11.6–15.1)
ETHANOL SERPL-MCNC: 287 MG/DL (ref 0–3)
EXT PREG TEST URINE: ABNORMAL
GFR SERPL CREATININE-BSD FRML MDRD: 122 ML/MIN/1.73SQ M
GLUCOSE SERPL-MCNC: 86 MG/DL (ref 65–140)
GLUCOSE UR STRIP-MCNC: NEGATIVE MG/DL
HCT VFR BLD AUTO: 38.8 % (ref 34.8–46.1)
HGB BLD-MCNC: 12.9 G/DL (ref 11.5–15.4)
HGB UR QL STRIP.AUTO: NEGATIVE
IMM GRANULOCYTES # BLD AUTO: 0.04 THOUSAND/UL (ref 0–0.2)
IMM GRANULOCYTES NFR BLD AUTO: 0 % (ref 0–2)
KETONES UR STRIP-MCNC: NEGATIVE MG/DL
LEUKOCYTE ESTERASE UR QL STRIP: NEGATIVE
LIPASE SERPL-CCNC: 107 U/L (ref 73–393)
LYMPHOCYTES # BLD AUTO: 2.62 THOUSANDS/ΜL (ref 0.6–4.47)
LYMPHOCYTES NFR BLD AUTO: 22 % (ref 14–44)
MCH RBC QN AUTO: 31.1 PG (ref 26.8–34.3)
MCHC RBC AUTO-ENTMCNC: 33.2 G/DL (ref 31.4–37.4)
MCV RBC AUTO: 94 FL (ref 82–98)
METHADONE UR QL: NEGATIVE
MONOCYTES # BLD AUTO: 0.74 THOUSAND/ΜL (ref 0.17–1.22)
MONOCYTES NFR BLD AUTO: 6 % (ref 4–12)
NEUTROPHILS # BLD AUTO: 8.5 THOUSANDS/ΜL (ref 1.85–7.62)
NEUTS SEG NFR BLD AUTO: 71 % (ref 43–75)
NITRITE UR QL STRIP: NEGATIVE
NRBC BLD AUTO-RTO: 0 /100 WBCS
OPIATES UR QL SCN: NEGATIVE
PCP UR QL: NEGATIVE
PH UR STRIP.AUTO: 6 [PH] (ref 5–9)
PLATELET # BLD AUTO: 353 THOUSANDS/UL (ref 149–390)
PMV BLD AUTO: 8.7 FL (ref 8.9–12.7)
POTASSIUM SERPL-SCNC: 3.3 MMOL/L (ref 3.5–5.3)
PROT SERPL-MCNC: 7.6 G/DL (ref 6.4–8.2)
PROT UR STRIP-MCNC: NEGATIVE MG/DL
RBC # BLD AUTO: 4.15 MILLION/UL (ref 3.81–5.12)
SODIUM SERPL-SCNC: 142 MMOL/L (ref 136–145)
SP GR UR STRIP.AUTO: 1.01 (ref 1–1.03)
THC UR QL: POSITIVE
UROBILINOGEN UR QL STRIP.AUTO: 0.2 E.U./DL
WBC # BLD AUTO: 11.99 THOUSAND/UL (ref 4.31–10.16)

## 2018-07-27 PROCEDURE — 84702 CHORIONIC GONADOTROPIN TEST: CPT | Performed by: EMERGENCY MEDICINE

## 2018-07-27 PROCEDURE — 96372 THER/PROPH/DIAG INJ SC/IM: CPT

## 2018-07-27 PROCEDURE — 81025 URINE PREGNANCY TEST: CPT | Performed by: EMERGENCY MEDICINE

## 2018-07-27 PROCEDURE — 80320 DRUG SCREEN QUANTALCOHOLS: CPT | Performed by: EMERGENCY MEDICINE

## 2018-07-27 PROCEDURE — 81003 URINALYSIS AUTO W/O SCOPE: CPT | Performed by: EMERGENCY MEDICINE

## 2018-07-27 PROCEDURE — 80053 COMPREHEN METABOLIC PANEL: CPT | Performed by: EMERGENCY MEDICINE

## 2018-07-27 PROCEDURE — 85025 COMPLETE CBC W/AUTO DIFF WBC: CPT | Performed by: EMERGENCY MEDICINE

## 2018-07-27 PROCEDURE — 80307 DRUG TEST PRSMV CHEM ANLYZR: CPT | Performed by: EMERGENCY MEDICINE

## 2018-07-27 PROCEDURE — 36415 COLL VENOUS BLD VENIPUNCTURE: CPT | Performed by: EMERGENCY MEDICINE

## 2018-07-27 PROCEDURE — 83690 ASSAY OF LIPASE: CPT | Performed by: EMERGENCY MEDICINE

## 2018-07-27 RX ORDER — ONDANSETRON 2 MG/ML
4 INJECTION INTRAMUSCULAR; INTRAVENOUS ONCE
Status: DISCONTINUED | OUTPATIENT
Start: 2018-07-27 | End: 2018-07-28 | Stop reason: HOSPADM

## 2018-07-27 RX ORDER — DIPHENHYDRAMINE HYDROCHLORIDE 50 MG/ML
50 INJECTION INTRAMUSCULAR; INTRAVENOUS ONCE
Status: DISCONTINUED | OUTPATIENT
Start: 2018-07-27 | End: 2018-07-27

## 2018-07-27 RX ORDER — DIPHENHYDRAMINE HYDROCHLORIDE 50 MG/ML
50 INJECTION INTRAMUSCULAR; INTRAVENOUS ONCE
Status: COMPLETED | OUTPATIENT
Start: 2018-07-27 | End: 2018-07-27

## 2018-07-27 RX ORDER — OLANZAPINE 10 MG/1
10 INJECTION, POWDER, LYOPHILIZED, FOR SOLUTION INTRAMUSCULAR ONCE
Status: COMPLETED | OUTPATIENT
Start: 2018-07-27 | End: 2018-07-27

## 2018-07-27 RX ADMIN — DIPHENHYDRAMINE HYDROCHLORIDE 50 MG: 50 INJECTION, SOLUTION INTRAMUSCULAR; INTRAVENOUS at 16:27

## 2018-07-27 RX ADMIN — WATER 2.1 ML: 1 INJECTION INTRAMUSCULAR; INTRAVENOUS; SUBCUTANEOUS at 16:35

## 2018-07-27 RX ADMIN — OLANZAPINE 10 MG: 10 INJECTION, POWDER, FOR SOLUTION INTRAMUSCULAR at 16:24

## 2018-07-27 NOTE — RESTRAINT FACE TO FACE
Restraint Face to Face   Jb Tatum 29 y o  female MRN: [id number]  Unit/Bed#: ED 02 Encounter: [id number]      Physical Evaluation     Purpose for Restraints/ Seclusion High risk for self harm  Patient's reaction to the intervention  Patient's medical condition  Patient's Behavioral condition  Restraints to be Continued

## 2018-07-27 NOTE — ED NOTES
Bra  Shirt  Leggings  Sunglasses  Purse with 2 lotion  Shoes  1 04 in change  Papers and receipts  Head phones   3 soaps  Matches and a lighter  1 cigarette       Mara Monahan  07/27/18 1926

## 2018-07-28 PROCEDURE — 99285 EMERGENCY DEPT VISIT HI MDM: CPT

## 2018-07-28 NOTE — ED NOTES
Finger food breakfast tray ordered for patient  Patient observed resting in bed  Appears to be sleeping  Respirations easy and unlabored  1:1 continues       Yokasta Francois RN  07/28/18 0800

## 2018-07-28 NOTE — ED NOTES
9:30 - Pt is a 29 y o  female who was brought to the ED by Yaw Bond  She reported that she was drinking alcohol yesterday and cannot remember what happened  Yaw Bond had reported that the pt made suicidal threats  Pt does not remember any of this  She reported that she has been drinking a little more lately than she usually has  Pt also reported that she has a hx of drug use (heroin and cocaine) but has been clean for approximately 2 months  (Her rapid drug screen did come back positive for [facility] )      Pt reported that she was treated in [facility] for a few months beginning in May 2017  She is currently attending a day program through [facility]  She attends program 4 days a week (M, W, TH, F )  Pt sees a counselor Yun Booth and a psychiatrist (Dr Myke Cleveland) through this program   Pt has an appointment with her psychiatrist next week  (But could not remember which day )     Pt is negative for SI/HI, hallucinations, and psychosis  Pt does not appear to be a danger to herself or others  She is cleared by [facility] for d/c     10:15 - Pt called a cab to get home      Phylicia Rosenberg MA  Crisis Intervention Worker  07/28/18

## 2018-07-28 NOTE — DISCHARGE INSTRUCTIONS
Alcohol Intoxication   WHAT YOU NEED TO KNOW:   Alcohol intoxication is a harmful physical condition caused when you drink more alcohol than your body can handle  It is also called ethanol poisoning, or being drunk  DISCHARGE INSTRUCTIONS:   Medicine: You may be given medicine to manage the signs and symptoms of alcohol intoxication  Take your medicine as directed  Contact your healthcare provider if you think your medicine is not helping or if you have side effects  Tell him if you are allergic to any medicine  Keep a list of the medicines, vitamins, and herbs you take  Include the amounts, and when and why you take them  Bring the list or the pill bottles to follow-up visits  Keep the list with you in case of emergency  Follow up with your healthcare provider as directed:  Write down your questions so you remember to ask them during your visits  Limit or avoid alcohol:  Men should not have more than 2 drinks per day  Women should not have more than 1 drink per day  A drink is 12 ounces of beer, 5 ounces of wine, or 1½ ounces of liquor  Do not drive or operate machines when you drink alcohol:  Make sure you always have someone to drive you when you drink alcohol  For more information:   · Alcoholics Anonymous  Web Address: http://www merritt Sitesimon/  Contact your healthcare provider if:   · You need help to stop drinking alcohol  · You have trouble with work or school because you drink too much alcohol  · You have physical or verbal fights because of alcohol  · You have questions or concerns about your condition or care  Return to the emergency department if:   · You have sudden trouble breathing or chest pain  · You have a seizure  · You feel sad enough to harm yourself or others  · You have hallucinations (you see or hear things that are not real)  · You cannot stop vomiting  · You were in an accident because of alcohol    © 2017 [employer] Information is for End User's use only and may not be sold, redistributed or otherwise used for commercial purposes  All illustrations and images included in CareNotes® are the copyrighted property of [employer]  or [address]  The above information is an  only  It is not intended as medical advice for individual conditions or treatments  Talk to your doctor, nurse or pharmacist before following any medical regimen to see if it is safe and effective for you

## 2018-07-28 NOTE — ED NOTES
Bagged belongings returned to patient  Discharged instructions reviewed with patient  Patient states understanding  Patient discharged to home       Hannah Ascencio RN  07/28/18 1708

## 2018-07-28 NOTE — ED NOTES
9:10 - Called [facility] to see if they had come to assess pt  Spoke with Yung, and he reported that he did not see the pt      Jh Kelley MA  Crisis Intervention Worker  07/28/18

## 2018-07-31 NOTE — ED PROVIDER NOTES
History  Chief Complaint   Patient presents with    Psychiatric Evaluation     pt presents to the ed with etoh intoxication  pt is currently pregnant and has been drinking because of a scheduled   pt is voicing suicidal ideation during triage      80-year-old female  History limited due to alcohol intoxication  Pregnant  Voicing suicidal ideation  History of heroin abuse  Prior to Admission Medications   Prescriptions Last Dose Informant Patient Reported? Taking?   chlorproMAZINE (THORAZINE) 200 mg tablet   Yes No   Sig: Take 200 mg by mouth Medrol Dose Pack scheduling ONLY   gabapentin (NEURONTIN) 300 mg capsule  Self Yes No   Sig: Take 600 mg by mouth 3 (three) times a day     hydrOXYzine pamoate (VISTARIL) 50 mg capsule  Self Yes No   Sig: Take 50 mg by mouth every evening   miconazole (MONISTAT 7) 100 mg vaginal suppository   No No   Sig: Insert 1 suppository (100 mg total) into the vagina daily at bedtime   sertraline (ZOLOFT) 100 mg tablet   Yes No   Sig: Take 100 mg by mouth daily   traZODone (DESYREL) 100 mg tablet   Yes No   Sig: Take 200 mg by mouth daily at bedtime      Facility-Administered Medications: None       Past Medical History:   Diagnosis Date    Psychiatric disorder     Bipolar 1 ,borderline personality disorder       History reviewed  No pertinent surgical history  History reviewed  No pertinent family history  I have reviewed and agree with the history as documented  Social History   Substance Use Topics    Smoking status: Current Every Day Smoker     Packs/day: 1 00    Smokeless tobacco: Never Used    Alcohol use Yes      Comment: occas        Review of Systems   Unable to perform ROS: Psychiatric disorder       Physical Exam  Physical Exam   Constitutional: She appears well-developed and well-nourished  HENT:   Head: Normocephalic and atraumatic  Eyes: EOM are normal  Pupils are equal, round, and reactive to light  Neck: Normal range of motion  Neck supple  Cardiovascular: Normal rate, regular rhythm, normal heart sounds and intact distal pulses  Pulmonary/Chest: Effort normal and breath sounds normal  No respiratory distress  She has no wheezes  She has no rales  Abdominal: Soft  Bowel sounds are normal  She exhibits no distension  There is no tenderness  Musculoskeletal: Normal range of motion  She exhibits no edema, tenderness or deformity  Neurological: She is alert  No focal motor or sensory deficits  Skin: Skin is warm and dry  No rash noted  Psychiatric:   Intoxicated and combative         Vital Signs  ED Triage Vitals [07/27/18 1557]   Temperature Pulse Respirations Blood Pressure SpO2   98 6 °F (37 °C) (!) 126 20 133/75 98 %      Temp src Heart Rate Source Patient Position - Orthostatic VS BP Location FiO2 (%)   -- Monitor Lying Right arm --      Pain Score       --           Vitals:    07/27/18 1557   BP: 133/75   Pulse: (!) 126   Patient Position - Orthostatic VS: Lying       Visual Acuity      ED Medications  Medications   OLANZapine (ZyPREXA) IM injection 10 mg (10 mg Intramuscular Given 7/27/18 1624)   sterile water injection **AcuDose Override Pull** (2 1 mL  Given 7/27/18 1635)   diphenhydrAMINE (BENADRYL) injection 50 mg (50 mg Intramuscular Given 7/27/18 1627)       Diagnostic Studies  Results Reviewed     Procedure Component Value Units Date/Time    Lipase [[id number]]  (Normal) Collected:  07/27/18 1615    Lab Status:  Final result Specimen:  Blood from Hand, Right Updated:  07/27/18 1712     Lipase 107 u/L     Quantitative hCG [[id number]]  (Abnormal) Collected:  07/27/18 1615    Lab Status:  Final result Specimen:  Blood from Hand, Right Updated:  07/27/18 1712     HCG, Quant 13,913 (H) mIU/mL     Narrative:          Expected Ranges:     Approximate               Approximate HCG  Gestation age          Concentration ( mIU/mL)  _____________          ______________________   [facility]                      HCG values  0 2-1 5-50  1-2                           2-3                         100-5000  3-4                         500-86195  4-5                         1000-14191  5-6                         [id number]  6-8                         64572-411396  8-12                        48084-836545    Comprehensive metabolic panel [[id number]]  (Abnormal) Collected:  07/27/18 1615    Lab Status:  Final result Specimen:  Blood from Hand, Right Updated:  07/27/18 1649     Sodium 142 mmol/L      Potassium 3 3 (L) mmol/L      Chloride 106 mmol/L      CO2 23 mmol/L      Anion Gap 13 mmol/L      BUN 1 (L) mg/dL      Creatinine 0 64 mg/dL      Glucose 86 mg/dL      Calcium 9 2 mg/dL      AST 19 U/L      ALT 18 U/L      Alkaline Phosphatase 55 U/L      Total Protein 7 6 g/dL      Albumin 3 8 g/dL      Total Bilirubin 0 20 mg/dL      eGFR 122 ml/min/1 73sq m     Narrative:         National Kidney Disease Education Program recommendations are as follows:  GFR calculation is accurate only with a steady state creatinine  Chronic Kidney disease less than 60 ml/min/1 73 sq  meters  Kidney failure less than 15 ml/min/1 73 sq  meters  Rapid drug screen, urine [[id number]]  (Abnormal) Collected:  07/27/18 1615    Lab Status:  Final result Specimen:  Urine from Urine, Other Updated:  07/27/18 1645     Amph/Meth UR Negative     Barbiturate Ur Negative     Benzodiazepine Urine Negative     Cocaine Urine Negative     Methadone Urine Negative     Opiate Urine Negative     PCP Ur Negative     THC Urine Positive (A)    Narrative:         Presumptive report  If requested, specimen will be sent to reference lab for confirmation  FOR MEDICAL PURPOSES ONLY  IF CONFIRMATION NEEDED PLEASE CONTACT THE LAB WITHIN 5 DAYS      Drug Screen Cutoff Levels:  AMPHETAMINE/METHAMPHETAMINES  1000 ng/mL  BARBITURATES     200 ng/mL  BENZODIAZEPINES     200 ng/mL  COCAINE      300 ng/mL  METHADONE      300 ng/mL  OPIATES      300 ng/mL  PHENCYCLIDINE     25 ng/mL  THC       50 ng/mL    Ethanol [[id number]]  (Abnormal) Collected:  07/27/18 1615    Lab Status:  Final result Specimen:  Blood from Hand, Right Updated:  07/27/18 1644     Ethanol Lvl 287 (H) mg/dL     UA w Reflex to Microscopic [[id number]] Collected:  07/27/18 1615    Lab Status:  Final result Specimen:  Urine from Urine, Other Updated:  07/27/18 1631     Color, UA Light Yellow     Clarity, UA Clear     Specific Gravity, UA 1 010     pH, UA 6 0     Leukocytes, UA Negative     Nitrite, UA Negative     Protein, UA Negative mg/dl      Glucose, UA Negative mg/dl      Ketones, UA Negative mg/dl      Urobilinogen, UA 0 2 E U /dl      Bilirubin, UA Negative     Blood, UA Negative    POCT pregnancy, urine [[id number]]  (Abnormal) Resulted:  07/27/18 1628    Lab Status:  Final result Updated:  07/27/18 1629     EXT PREG TEST UR (Ref: Negative) POSTIVE    CBC and differential [[id number]]  (Abnormal) Collected:  07/27/18 1615    Lab Status:  Final result Specimen:  Blood from Hand, Right Updated:  07/27/18 1626     WBC 11 99 (H) Thousand/uL      RBC 4 15 Million/uL      Hemoglobin 12 9 g/dL      Hematocrit 38 8 %      MCV 94 fL      MCH 31 1 pg      MCHC 33 2 g/dL      RDW 13 4 %      MPV 8 7 (L) fL      Platelets 412 Thousands/uL      nRBC 0 /100 WBCs      Neutrophils Relative 71 %      Immat GRANS % 0 %      Lymphocytes Relative 22 %      Monocytes Relative 6 %      Eosinophils Relative 0 %      Basophils Relative 1 %      Neutrophils Absolute 8 50 (H) Thousands/µL      Immature Grans Absolute 0 04 Thousand/uL      Lymphocytes Absolute 2 62 Thousands/µL      Monocytes Absolute 0 74 Thousand/µL      Eosinophils Absolute 0 02 Thousand/µL      Basophils Absolute 0 07 Thousands/µL                  No orders to display              Procedures  Procedures       Phone Contacts  ED Phone Contact    ED Course                               MDM  Number of Diagnoses or Management Options  Alcoholic intoxication with complication [facility]):   Diagnosis management comments: Patient required chemical and physical restraint in the emergency room  When sober, patient was seen by crisis  CritCare Time    Disposition  Final diagnoses:   Alcoholic intoxication with complication [facility])     Time reflects when diagnosis was documented in both MDM as applicable and the Disposition within this note     Time User Action Codes Description Comment    7/28/2018  9:37 AM Kassandra Fonseca Add [Q52 928] Alcoholic intoxication with complication [facility])       ED Disposition     ED Disposition Condition Comment    Discharge  Frankey Meals discharge to home/self care  Condition at discharge: Stable        Follow-up Information     Follow up With Specialties Details Why Nathalia Ziegler MD [facility] Schedule an appointment as soon as possible for a visit As needed [id number] [facility] [id number]  [phone]            Discharge Medication List as of 7/28/2018  9:38 AM      CONTINUE these medications which have NOT CHANGED    Details   chlorproMAZINE (THORAZINE) 200 mg tablet Take 200 mg by mouth Medrol Dose Pack scheduling ONLY, Historical Med      gabapentin (NEURONTIN) 300 mg capsule Take 600 mg by mouth 3 (three) times a day  , Historical Med      hydrOXYzine pamoate (VISTARIL) 50 mg capsule Take 50 mg by mouth every evening, Historical Med      sertraline (ZOLOFT) 100 mg tablet Take 100 mg by mouth daily, Historical Med      traZODone (DESYREL) 100 mg tablet Take 200 mg by mouth daily at bedtime, Historical Med      miconazole (MONISTAT 7) 100 mg vaginal suppository Insert 1 suppository (100 mg total) into the vagina daily at bedtime, Starting Mon 7/16/2018, Print           No discharge procedures on file      ED Provider  Electronically Signed by           Gricel Neely MD  08/01/18 1854

## 2018-08-01 ENCOUNTER — APPOINTMENT (EMERGENCY)
Dept: RADIOLOGY | Facility: HOSPITAL | Age: 28
DRG: 886 | End: 2018-08-01
Payer: COMMERCIAL

## 2018-08-01 ENCOUNTER — HOSPITAL ENCOUNTER (INPATIENT)
Facility: HOSPITAL | Age: 28
LOS: 2 days | Discharge: HOME | DRG: 886 | End: 2018-08-03
Attending: EMERGENCY MEDICINE | Admitting: STUDENT IN AN ORGANIZED HEALTH CARE EDUCATION/TRAINING PROGRAM
Payer: COMMERCIAL

## 2018-08-01 DIAGNOSIS — F31.9 BIPOLAR AFFECTIVE DISORDER, REMISSION STATUS UNSPECIFIED (HCC): ICD-10-CM

## 2018-08-01 DIAGNOSIS — R07.9 CHEST PAIN: ICD-10-CM

## 2018-08-01 DIAGNOSIS — T50.901A OVERDOSE: ICD-10-CM

## 2018-08-01 DIAGNOSIS — F10.10 ALCOHOL ABUSE: ICD-10-CM

## 2018-08-01 DIAGNOSIS — E87.6 HYPOKALEMIA: ICD-10-CM

## 2018-08-01 DIAGNOSIS — Z3A.01 LESS THAN 8 WEEKS GESTATION OF PREGNANCY: ICD-10-CM

## 2018-08-01 DIAGNOSIS — J69.0 ASPIRATION PNEUMONIA (HCC): Primary | ICD-10-CM

## 2018-08-01 PROBLEM — D72.829 LEUKOCYTOSIS: Status: ACTIVE | Noted: 2018-08-01

## 2018-08-01 PROBLEM — E87.1 HYPONATREMIA: Status: ACTIVE | Noted: 2018-08-01

## 2018-08-01 PROBLEM — Z34.90 PREGNANCY: Status: ACTIVE | Noted: 2018-08-01

## 2018-08-01 LAB
ALBUMIN SERPL BCP-MCNC: 4 G/DL (ref 3.5–5)
ALP SERPL-CCNC: 59 U/L (ref 46–116)
ALT SERPL W P-5'-P-CCNC: 26 U/L (ref 12–78)
AMPHETAMINES SERPL QL SCN: NEGATIVE
ANION GAP SERPL CALCULATED.3IONS-SCNC: 14 MMOL/L (ref 4–13)
AST SERPL W P-5'-P-CCNC: 29 U/L (ref 5–45)
ATRIAL RATE: 85 BPM
B-HCG SERPL-ACNC: ABNORMAL MIU/ML
BACTERIA UR QL AUTO: ABNORMAL /HPF
BARBITURATES UR QL: NEGATIVE
BASOPHILS # BLD MANUAL: 0 THOUSAND/UL (ref 0–0.1)
BASOPHILS NFR MAR MANUAL: 0 % (ref 0–1)
BENZODIAZ UR QL: NEGATIVE
BILIRUB SERPL-MCNC: 0.4 MG/DL (ref 0.2–1)
BILIRUB UR QL STRIP: NEGATIVE
BUN SERPL-MCNC: 16 MG/DL (ref 5–25)
CALCIUM SERPL-MCNC: 8.9 MG/DL (ref 8.3–10.1)
CHLORIDE SERPL-SCNC: 95 MMOL/L (ref 100–108)
CLARITY UR: ABNORMAL
CO2 SERPL-SCNC: 21 MMOL/L (ref 21–32)
COCAINE UR QL: POSITIVE
COLOR UR: YELLOW
CREAT SERPL-MCNC: 1.26 MG/DL (ref 0.6–1.3)
EOSINOPHIL # BLD MANUAL: 0 THOUSAND/UL (ref 0–0.4)
EOSINOPHIL NFR BLD MANUAL: 0 % (ref 0–6)
ERYTHROCYTE [DISTWIDTH] IN BLOOD BY AUTOMATED COUNT: 13.3 % (ref 11.6–15.1)
ETHANOL SERPL-MCNC: <3 MG/DL (ref 0–3)
EXT PREG TEST URINE: POSITIVE
GFR SERPL CREATININE-BSD FRML MDRD: 58 ML/MIN/1.73SQ M
GLUCOSE SERPL-MCNC: 189 MG/DL (ref 65–140)
GLUCOSE UR STRIP-MCNC: ABNORMAL MG/DL
HCT VFR BLD AUTO: 37 % (ref 34.8–46.1)
HGB BLD-MCNC: 12.8 G/DL (ref 11.5–15.4)
HGB UR QL STRIP.AUTO: ABNORMAL
HYALINE CASTS #/AREA URNS LPF: ABNORMAL /LPF
KETONES UR STRIP-MCNC: ABNORMAL MG/DL
L PNEUMO1 AG UR QL IA.RAPID: NEGATIVE
LACTATE SERPL-SCNC: 1 MMOL/L (ref 0.5–2)
LEUKOCYTE ESTERASE UR QL STRIP: ABNORMAL
LIPASE SERPL-CCNC: 72 U/L (ref 73–393)
LYMPHOCYTES # BLD AUTO: 0.72 THOUSAND/UL (ref 0.6–4.47)
LYMPHOCYTES # BLD AUTO: 2 % (ref 14–44)
MAGNESIUM SERPL-MCNC: 2 MG/DL (ref 1.6–2.6)
MCH RBC QN AUTO: 31.8 PG (ref 26.8–34.3)
MCHC RBC AUTO-ENTMCNC: 34.6 G/DL (ref 31.4–37.4)
MCV RBC AUTO: 92 FL (ref 82–98)
METHADONE UR QL: NEGATIVE
MONOCYTES # BLD AUTO: 1.44 THOUSAND/UL (ref 0–1.22)
MONOCYTES NFR BLD: 4 % (ref 4–12)
NEUTROPHILS # BLD MANUAL: 33.77 THOUSAND/UL (ref 1.85–7.62)
NEUTS BAND NFR BLD MANUAL: 11 % (ref 0–8)
NEUTS SEG NFR BLD AUTO: 83 % (ref 43–75)
NITRITE UR QL STRIP: NEGATIVE
NON-SQ EPI CELLS URNS QL MICRO: ABNORMAL /HPF
NRBC BLD AUTO-RTO: 0 /100 WBCS
OPIATES UR QL SCN: POSITIVE
P AXIS: 67 DEGREES
PCP UR QL: NEGATIVE
PH UR STRIP.AUTO: 5 [PH] (ref 5–9)
PLATELET # BLD AUTO: 338 THOUSANDS/UL (ref 149–390)
PLATELET BLD QL SMEAR: ADEQUATE
PMV BLD AUTO: 9 FL (ref 8.9–12.7)
POTASSIUM SERPL-SCNC: 3 MMOL/L (ref 3.5–5.3)
PR INTERVAL: 150 MS
PROT SERPL-MCNC: 7.9 G/DL (ref 6.4–8.2)
PROT UR STRIP-MCNC: ABNORMAL MG/DL
QRS AXIS: 67 DEGREES
QRSD INTERVAL: 90 MS
QT INTERVAL: 382 MS
QTC INTERVAL: 454 MS
RBC # BLD AUTO: 4.02 MILLION/UL (ref 3.81–5.12)
RBC #/AREA URNS AUTO: ABNORMAL /HPF
RBC MORPH BLD: NORMAL
S PNEUM AG UR QL: NEGATIVE
SODIUM SERPL-SCNC: 130 MMOL/L (ref 136–145)
SP GR UR STRIP.AUTO: >=1.03 (ref 1–1.03)
T WAVE AXIS: 47 DEGREES
THC UR QL: POSITIVE
TOTAL CELLS COUNTED SPEC: 100
TOXIC GRANULES BLD QL SMEAR: PRESENT
TROPONIN I SERPL-MCNC: <0.02 NG/ML
UROBILINOGEN UR QL STRIP.AUTO: 0.2 E.U./DL
VENTRICULAR RATE: 85 BPM
WBC # BLD AUTO: 35.93 THOUSAND/UL (ref 4.31–10.16)
WBC #/AREA URNS AUTO: ABNORMAL /HPF

## 2018-08-01 PROCEDURE — 80307 DRUG TEST PRSMV CHEM ANLYZR: CPT | Performed by: EMERGENCY MEDICINE

## 2018-08-01 PROCEDURE — 93010 ELECTROCARDIOGRAM REPORT: CPT | Performed by: INTERNAL MEDICINE

## 2018-08-01 PROCEDURE — 81025 URINE PREGNANCY TEST: CPT | Performed by: EMERGENCY MEDICINE

## 2018-08-01 PROCEDURE — 87449 NOS EACH ORGANISM AG IA: CPT | Performed by: STUDENT IN AN ORGANIZED HEALTH CARE EDUCATION/TRAINING PROGRAM

## 2018-08-01 PROCEDURE — 87081 CULTURE SCREEN ONLY: CPT | Performed by: FAMILY MEDICINE

## 2018-08-01 PROCEDURE — 84484 ASSAY OF TROPONIN QUANT: CPT | Performed by: EMERGENCY MEDICINE

## 2018-08-01 PROCEDURE — 83735 ASSAY OF MAGNESIUM: CPT | Performed by: STUDENT IN AN ORGANIZED HEALTH CARE EDUCATION/TRAINING PROGRAM

## 2018-08-01 PROCEDURE — 93005 ELECTROCARDIOGRAM TRACING: CPT

## 2018-08-01 PROCEDURE — 83605 ASSAY OF LACTIC ACID: CPT | Performed by: EMERGENCY MEDICINE

## 2018-08-01 PROCEDURE — 80053 COMPREHEN METABOLIC PANEL: CPT | Performed by: EMERGENCY MEDICINE

## 2018-08-01 PROCEDURE — 81001 URINALYSIS AUTO W/SCOPE: CPT | Performed by: EMERGENCY MEDICINE

## 2018-08-01 PROCEDURE — 99285 EMERGENCY DEPT VISIT HI MDM: CPT

## 2018-08-01 PROCEDURE — 83690 ASSAY OF LIPASE: CPT | Performed by: EMERGENCY MEDICINE

## 2018-08-01 PROCEDURE — 84702 CHORIONIC GONADOTROPIN TEST: CPT | Performed by: STUDENT IN AN ORGANIZED HEALTH CARE EDUCATION/TRAINING PROGRAM

## 2018-08-01 PROCEDURE — 99222 1ST HOSP IP/OBS MODERATE 55: CPT | Performed by: STUDENT IN AN ORGANIZED HEALTH CARE EDUCATION/TRAINING PROGRAM

## 2018-08-01 PROCEDURE — 96375 TX/PRO/DX INJ NEW DRUG ADDON: CPT

## 2018-08-01 PROCEDURE — 71045 X-RAY EXAM CHEST 1 VIEW: CPT

## 2018-08-01 PROCEDURE — 85027 COMPLETE CBC AUTOMATED: CPT | Performed by: EMERGENCY MEDICINE

## 2018-08-01 PROCEDURE — 85007 BL SMEAR W/DIFF WBC COUNT: CPT | Performed by: EMERGENCY MEDICINE

## 2018-08-01 PROCEDURE — 36415 COLL VENOUS BLD VENIPUNCTURE: CPT | Performed by: EMERGENCY MEDICINE

## 2018-08-01 PROCEDURE — 80320 DRUG SCREEN QUANTALCOHOLS: CPT | Performed by: EMERGENCY MEDICINE

## 2018-08-01 PROCEDURE — 96365 THER/PROPH/DIAG IV INF INIT: CPT

## 2018-08-01 RX ORDER — HYDROXYZINE HYDROCHLORIDE 25 MG/1
50 TABLET, FILM COATED ORAL
Status: DISCONTINUED | OUTPATIENT
Start: 2018-08-01 | End: 2018-08-03 | Stop reason: HOSPADM

## 2018-08-01 RX ORDER — CHLORPROMAZINE HYDROCHLORIDE 100 MG/1
200 TABLET, FILM COATED ORAL
Status: DISCONTINUED | OUTPATIENT
Start: 2018-08-01 | End: 2018-08-03 | Stop reason: HOSPADM

## 2018-08-01 RX ORDER — LORAZEPAM 0.5 MG/1
0.5 TABLET ORAL EVERY 8 HOURS PRN
Status: DISCONTINUED | OUTPATIENT
Start: 2018-08-01 | End: 2018-08-01

## 2018-08-01 RX ORDER — SODIUM CHLORIDE 9 MG/ML
50 INJECTION, SOLUTION INTRAVENOUS CONTINUOUS
Status: DISPENSED | OUTPATIENT
Start: 2018-08-01 | End: 2018-08-01

## 2018-08-01 RX ORDER — ONDANSETRON 2 MG/ML
INJECTION INTRAMUSCULAR; INTRAVENOUS
Status: COMPLETED
Start: 2018-08-01 | End: 2018-08-01

## 2018-08-01 RX ORDER — ACETAMINOPHEN 325 MG/1
650 TABLET ORAL EVERY 6 HOURS PRN
Status: DISCONTINUED | OUTPATIENT
Start: 2018-08-01 | End: 2018-08-03 | Stop reason: HOSPADM

## 2018-08-01 RX ORDER — THIAMINE MONONITRATE (VIT B1) 100 MG
100 TABLET ORAL DAILY
Status: DISCONTINUED | OUTPATIENT
Start: 2018-08-01 | End: 2018-08-03 | Stop reason: HOSPADM

## 2018-08-01 RX ORDER — GABAPENTIN 300 MG/1
600 CAPSULE ORAL 3 TIMES DAILY
Status: DISCONTINUED | OUTPATIENT
Start: 2018-08-01 | End: 2018-08-03 | Stop reason: HOSPADM

## 2018-08-01 RX ORDER — FOLIC ACID 1 MG/1
1 TABLET ORAL DAILY
Status: DISCONTINUED | OUTPATIENT
Start: 2018-08-01 | End: 2018-08-03 | Stop reason: HOSPADM

## 2018-08-01 RX ORDER — SERTRALINE HYDROCHLORIDE 100 MG/1
100 TABLET, FILM COATED ORAL DAILY
Status: DISCONTINUED | OUTPATIENT
Start: 2018-08-01 | End: 2018-08-03 | Stop reason: HOSPADM

## 2018-08-01 RX ORDER — ONDANSETRON 2 MG/ML
4 INJECTION INTRAMUSCULAR; INTRAVENOUS EVERY 6 HOURS PRN
Status: DISCONTINUED | OUTPATIENT
Start: 2018-08-01 | End: 2018-08-03 | Stop reason: HOSPADM

## 2018-08-01 RX ORDER — ONDANSETRON 2 MG/ML
4 INJECTION INTRAMUSCULAR; INTRAVENOUS ONCE
Status: COMPLETED | OUTPATIENT
Start: 2018-08-01 | End: 2018-08-01

## 2018-08-01 RX ORDER — GINSENG 100 MG
1 CAPSULE ORAL 2 TIMES DAILY
Status: DISCONTINUED | OUTPATIENT
Start: 2018-08-01 | End: 2018-08-03 | Stop reason: HOSPADM

## 2018-08-01 RX ADMIN — SODIUM CHLORIDE 1000 ML: 0.9 INJECTION, SOLUTION INTRAVENOUS at 03:45

## 2018-08-01 RX ADMIN — SODIUM CHLORIDE 3 G: 9 INJECTION, SOLUTION INTRAVENOUS at 15:28

## 2018-08-01 RX ADMIN — SODIUM CHLORIDE 1000 ML: 0.9 INJECTION, SOLUTION INTRAVENOUS at 05:36

## 2018-08-01 RX ADMIN — Medication 100 MG: at 05:35

## 2018-08-01 RX ADMIN — ONDANSETRON 4 MG: 2 INJECTION INTRAMUSCULAR; INTRAVENOUS at 12:48

## 2018-08-01 RX ADMIN — ONDANSETRON 4 MG: 2 INJECTION INTRAMUSCULAR; INTRAVENOUS at 02:41

## 2018-08-01 RX ADMIN — SERTRALINE HYDROCHLORIDE 100 MG: 100 TABLET ORAL at 12:48

## 2018-08-01 RX ADMIN — BACITRACIN ZINC 1 SMALL APPLICATION: 500 OINTMENT TOPICAL at 19:57

## 2018-08-01 RX ADMIN — SODIUM CHLORIDE 3 G: 9 INJECTION, SOLUTION INTRAVENOUS at 09:15

## 2018-08-01 RX ADMIN — GABAPENTIN 600 MG: 300 CAPSULE ORAL at 16:46

## 2018-08-01 RX ADMIN — ACETAMINOPHEN 650 MG: 325 TABLET, FILM COATED ORAL at 17:01

## 2018-08-01 RX ADMIN — SODIUM CHLORIDE 50 ML/HR: 0.9 INJECTION, SOLUTION INTRAVENOUS at 08:50

## 2018-08-01 RX ADMIN — GABAPENTIN 600 MG: 300 CAPSULE ORAL at 12:47

## 2018-08-01 RX ADMIN — SODIUM CHLORIDE 3 G: 9 INJECTION, SOLUTION INTRAVENOUS at 21:07

## 2018-08-01 RX ADMIN — HYDROXYZINE HYDROCHLORIDE 50 MG: 25 TABLET, FILM COATED ORAL at 21:07

## 2018-08-01 RX ADMIN — FOLIC ACID 1 MG: 1 TABLET ORAL at 05:35

## 2018-08-01 RX ADMIN — PIPERACILLIN SODIUM,TAZOBACTAM SODIUM 3.38 G: 3; .375 INJECTION, POWDER, FOR SOLUTION INTRAVENOUS at 04:02

## 2018-08-01 RX ADMIN — CHLORPROMAZINE HYDROCHLORIDE 200 MG: 100 TABLET, SUGAR COATED ORAL at 21:06

## 2018-08-01 RX ADMIN — GABAPENTIN 600 MG: 300 CAPSULE ORAL at 21:06

## 2018-08-01 RX ADMIN — BACITRACIN ZINC 1 SMALL APPLICATION: 500 OINTMENT TOPICAL at 12:48

## 2018-08-01 NOTE — PROGRESS NOTES
Patient's all psychotropic medications order at her request after discussing the risks versus the benefit off her being pregnant she repeatedly reported that she is going to terminate the pregnancy and she needs her medications otherwise she will go through the withdrawal and will become very sick she is able to understand the risk of taking the medications to the baby she reported that she is only 6 weeks pregnant and she is not going to keep the baby I order all her medications at her request patient is noncompliance with recommended treatments any why she has been abusing all kind of drugs drinking alcohol even though she is pregnant when I explained her that she is damaging the baby by doing the drugs and drinking alcohol she reports that she is aware of that but she cannot help it and she is going to terminate the pregnancy  Patient's alcohol level was less than 3 drug screen was positive for marijuana positive for cocaine and positive for opiate  Patient is suffering from bipolar disorder borderline personality disorder and she has self-mutilating behavior   Discussed with the nurse at length

## 2018-08-01 NOTE — ASSESSMENT & PLAN NOTE
Leukocytosis resolved  Chest x-ray does not show any obvious pneumonia; however procalcitonin level was elevated and patient did have vomiting several times and was started on Unasyn for possible aspiration pneumonia and discharged on Augmentin  urine strep and legionella negative    UA showed possible signs of UTI

## 2018-08-01 NOTE — H&P
History and Physical - [facility] Internal Medicine    Patient Information: Jemma Thao 29 y o  female MRN: [id number]  Unit/Bed#: ED CT2 Encounter: [id number]  Admitting Physician: Denise Jenkins MD  PCP: Gary Gagnon MD  Date of Admission:  18    Chief Complaint:     Drug overdose     of Present Illness:    Jemma Thao is a 29 y o  Pregnant female with a PMH of Drug Abuse, Alcohol Abuse and Bipolar Disorder who presents with drug overdose  She states that she has not been feeling well for the past several weeks in regards to her mental state  Though she denies any active suicidal ideation there is some worsening depression  She has been binge drinking once a week  She is currently 6 weeks pregnant and is contemplating an   Several hours ago she states that she smoked crack and then injected heroin causing her to pass out  She was given Narcan and recovered  She then vomited several times and has been having aches  In the ER she also complained of midsternal chest pain which has now resolved  She denies any shortness of breath or abdominal pain but does report vaginal spotting since yesterday however no overt bleeding  Currently she denies any suicidal ideation  She does have multiple scabbed lesions on her face and body which she attributes to "picking at her skin" when using crack  Review of Systems:    Review of Systems   Unable to perform ROS: Other   pt states "I dont know what's been going on for the past few days"    Past Medical and Surgical History:     Past Medical History:   Diagnosis Date    Psychiatric disorder     Bipolar 1 ,borderline personality disorder       History reviewed  No pertinent surgical history  Meds/Allergies:    PTA meds:   Prior to Admission Medications   Prescriptions Last Dose Informant Patient Reported?  Taking?   chlorproMAZINE (THORAZINE) 200 mg tablet   Yes No   Sig: Take 200 mg by mouth Medrol Dose Pack scheduling ONLY   gabapentin (NEURONTIN) 300 mg capsule  Self Yes No   Sig: Take 600 mg by mouth 3 (three) times a day     hydrOXYzine pamoate (VISTARIL) 50 mg capsule  Self Yes No   Sig: Take 50 mg by mouth every evening   miconazole (MONISTAT 7) 100 mg vaginal suppository   No No   Sig: Insert 1 suppository (100 mg total) into the vagina daily at bedtime   sertraline (ZOLOFT) 100 mg tablet   Yes No   Sig: Take 100 mg by mouth daily   traZODone (DESYREL) 100 mg tablet   Yes No   Sig: Take 200 mg by mouth daily at bedtime      Facility-Administered Medications: None       Allergies: No Known Allergies  History:     Marital Status: Single   History   Alcohol Use    Yes     Comment: occas     History   Smoking Status    Current Every Day Smoker    Packs/day: 1 00   Smokeless Tobacco    Never Used     History   Drug Use    Types: Heroin, Marijuana, Cocaine     Comment:  OD in May,now clean from heroin,still smokes marijuana few times per week       Family History:     Mother: Brain tumor  Father: healthy     Physical Exam:     Vitals:   Blood Pressure: 109/69 (08/01/18 0357)  Pulse: 76 (08/01/18 0357)  Temperature: 98 5 °F (36 9 °C) (08/01/18 0357)  Temp Source: Tympanic (08/01/18 0357)  Respirations: 20 (08/01/18 0357)  Height: 5' 6" (167 6 cm) (08/01/18 0210)  Weight - Scale: 81 6 kg (180 lb) (08/01/18 0210)  SpO2: 99 % (08/01/18 0357)    Physical Exam:   General: anxious, distressed   HEENT: multiple scabs across entire face   CVS: RRR, no murmurs appreciated  Lungs: CTAL, no wheezing or rales appreciated  Abdomen: soft, nondistended, bowel sounds normal, nontender upon palpation, no guarding or rebound tenderness  Extremities: no edema, no calf swelling or tenderness  Neuro: alert and oriented x3, normal handgrip strength bilaterally, sensation intact in all extremities  Psych: anxious, cooperative      Lab Results:       Results from last 7 days  Lab Units 08/01/18  0243 07/27/18  1615   WBC Thousand/uL 35 93* 11 99*   HEMOGLOBIN g/dL 12 8 12 9   HEMATOCRIT % 37 0 38 8   PLATELETS Thousands/uL 338 353   NEUTROS PCT %  --  71   LYMPHS PCT %  --  22   LYMPHO PCT % 2*  --    MONOS PCT %  --  6   MONO PCT MAN % 4  --    EOS PCT %  --  0   EOSINO PCT MANUAL % 0  --        Results from last 7 days  Lab Units 18  0243   SODIUM mmol/L 130*   POTASSIUM mmol/L 3 0*   CHLORIDE mmol/L 95*   CO2 mmol/L 21   BUN mg/dL 16   CREATININE mg/dL 1 26   CALCIUM mg/dL 8 9   TOTAL PROTEIN g/dL 7 9   BILIRUBIN TOTAL mg/dL 0 40   ALK PHOS U/L 59   ALT U/L 26   AST U/L 29   GLUCOSE RANDOM mg/dL 189*           Imaging:     Us Ob Pregnancy Limited With Transvaginal    Result Date: 2018  Narrative: FIRST TRIMESTER OBSTETRIC ULTRASOUND, COMPLETE INDICATION:  Lower abdominal cramping for 5 days  LMP is 2018, beta-hCG measures 389   COMPARISON: None  TECHNIQUE:   Transabdominal ultrasound of the pelvis was performed  Additional transvaginal imaging was then performed to better assess the gestation, myometrial/endometrial architecture and ovarian parenchymal detail  The study includes volumetric sweeps and traditional still imaging technique  FINDINGS: No intrauterine gestation or adnexal mass identified  Differential remains early IUP, spontaneous  and ectopic pregnancy  Correlate with serial quantitative BHCG  There is no significant subchorionic fluid collection  UTERUS/ADNEXA: The uterus and ovaries are within normal limits, right corpus luteum  The cervix remains closed  Trace nonspecific free fluid in the pelvis which could be physiologic for the patient's age  Impression: No intrauterine gestation or adnexal mass identified  Differential remains early IUP, spontaneous  and ectopic pregnancy  Follow-up with serial beta hCG and pelvic/OB ultrasound recommended in 1-2 weeks or less if clinical symptoms worsen   Workstation performed: [id number]     Us Ob Pregnancy Limited With Transvaginal    Result Date: 2018  Narrative: OBSTETRIC ULTRASOUND, LIMITED INDICATION: 68-year-old female found out she was pregnant today but is unsure of her LMP, possibly in May 2018    Abdominal pain    4 para 0,  4  Prior elective abortions COMPARISON: None  TECHNIQUE:   Transabdominal ultrasound of the pelvis was performed  Additional transvaginal imaging was then performed to better assess, myometrial/endometrial architecture and ovarian parenchymal detail  The study includes volumetric sweeps and traditional still imaging technique  FINDINGS: UTERUS: The uterus is anteverted in position, measuring 8 9 x 4 8 x 5 6 cm  Contour and echotexture appear normal  The cervix is closed and within normal limits  ENDOMETRIUM:  Endometrial stripe measures 8 mm  No evidence of intrauterine gestation  OVARIES/ADNEXA: No adnexal mass evident  There is trace pelvic free fluid which appears simple  Right ovary:  2 4 x 4 2 x 2 2 cm  Doppler flow present  There is a round slightly heterogeneous subtly hypoechoic region within the ovary which does demonstrate some peripheral blood flow  I suspect this is probably corpus luteum rather than ectopic pregnancy  Close follow-up is obviously warranted in this clinical scenario  Left ovary:  3 2 x 1 3 x 1 5 cm  Doppler flow present  No suspicious abnormality  Impression: No intrauterine gestation or suspicious adnexal mass identified  Rounded subtle hypoechoic region in the right ovary with peripheral blood flow is probably corpus luteum  Close follow-up suggested  Differential remains early IUP, spontaneous  and ectopic pregnancy  Correlate with serial quantitative BHCG  Workstation performed: [id number]         Assessment/Plan    * Drug overdose   Assessment & Plan    Pt reports that she was using crack/heroin and passed out  Regained consciousness after Narcan  Reports this was not intentional but does report worsening depression/bipolar symptoms    Will admit to Medicine, monitor on telemetry, hold any sedative medications and consult Psychiatry service         Chest pain   Assessment & Plan    Now resolved  Initial troponin was WNL  Given cocaine use, will monitor on telemetry and continue to trend         Hyponatremia   Assessment & Plan    Likely secondary to vomiting  Will hydrate gently and repeat BMP later today         Bipolar disorder [facility])   Assessment & Plan    Currently on Thorazine, Zoloft and Trazodone  Will hold for now until Psychiatry service evaluates pt         Pregnancy   Assessment & Plan    Pt reports contemplating   She also reports some vaginal spotting since yesterday  Discussed case with Dr Leyda Bond who recommends checking a beta hcg quantitative level for now  No further recommendations at this time         Alcohol abuse   Assessment & Plan    Will order thiamine/folic acid and Ativan prn  Psychiatry service consulted as above         Leukocytosis   Assessment & Plan    CXR with possible right lung infiltrate (offical read pending)  She was given Zosyn in the ED  Will start on Unasyn for now to cover for Aspiration Pneumonia and check Procalcitonin, urine strep and legionella  Hypokalemia   Assessment & Plan    Likely secondary to vomiting  Replacement ordered  Will follow up K level later today             Hospital Problem List:     Principal Problem:    Drug overdose  Active Problems:    Hypokalemia    Leukocytosis    Alcohol abuse    Pregnancy    Bipolar disorder (HCC)    Hyponatremia    Chest pain        VTE Prophylaxis: SCDs  Code Status: No Order    Anticipated Length of Stay:  Patient will be admitted on an Inpatient basis with an anticipated length of stay of atleast 2 midnights  Total Time for Visit, including Counseling / Coordination of Care: 30 minutes  Greater than 50% of this total time spent on direct patient counseling and coordination of care

## 2018-08-01 NOTE — PROGRESS NOTES
Patient has reviewed all blood work that was ordered  Dr Nessa Vera is aware  Education to the patient was given  Patient still refused  Continued observation for the patient is ongoing  In addition, patient continually seeking medications of any kind  Continued outbursts of anger and verbal abuse towards staff when patient does not receive medications that she is seeking  Dr Andreina Ivy was paged to address this  No new orders at this time  Dr Nessa Vera is also aware  Nursing staff will continue to monitor withdrawal symptoms and morning sickness due to pregnancy

## 2018-08-01 NOTE — ASSESSMENT & PLAN NOTE
Patient reported that she was using crack/heroin and passed out  She received Narcan  Refused inpatient drug/alcohol rehab  She was seen by Psychiatry here  Patient advised to not use illicit drugs  She will follow up with her psychiatrist and therapist at [facility] after the discharge

## 2018-08-01 NOTE — CASE MANAGEMENT
Initial Clinical Review    Admission: Date/Time/Statement: 18 @ 0439     Orders Placed This Encounter   Procedures    Inpatient Admission (expected length of stay for this patient is greater than two midnights)     Standing Status:   Standing     Number of Occurrences:   1     Order Specific Question:   Admitting Physician     Answer:   Jennifer Giana [[id number]]     Order Specific Question:   Level of Care     Answer:   Med Surg [[id number]]     Order Specific Question:   Estimated length of stay     Answer:   More than 2 Midnights     Order Specific Question:   Certification     Answer:   I certify that inpatient services are medically necessary for this patient for a duration of greater than two midnights  See H&P and MD Progress Notes for additional information about the patient's course of treatment  ED: Date/Time/Mode of Arrival:   ED Arrival Information     Expected Arrival Acuity Means of Arrival Escorted By Service Admission Type    - 2018 02:07 Emergent Walk-In Other General Medicine Emergency    Arrival Complaint    Overdose          Chief Complaint:   Chief Complaint   Patient presents with    Overdose - Accidental     patient admits to smoking cocaine and shooting up "alot of heroin" unsure of how many bags, parents gave 8mg of narcan, is pregnant states is getting an  today, admits to picking at skin       History of Illness: Kathi Robles is a 29 y o  Pt in ER with medics  She was found obtunded by her parents and was given 8mg IN Narcan  Pregnant female with a PMH of Drug Abuse, Alcohol Abuse and Bipolar Disorder who presents with drug overdose  She states that she has not been feeling well for the past several weeks in regards to her mental state  Though she denies any active suicidal ideation there is some worsening depression  She has been binge drinking once a week  She is currently 6 weeks pregnant and is contemplating an     Several hours ago she states that she smoked crack and then injected heroin causing her to pass out  She was given Narcan and recovered  She then vomited several times and has been having aches  In the ER she also complained of midsternal chest pain which has now resolved  She denies any shortness of breath or abdominal pain but does report vaginal spotting since yesterday however no overt bleeding  Currently she denies any suicidal ideation  She does have multiple scabbed lesions on her face and body which she attributes to "picking at her skin" when using crack       ED Vital Signs:   ED Triage Vitals [08/01/18 0210]   Temperature Pulse Respirations Blood Pressure SpO2   99 1 °F (37 3 °C) 96 (!) 28 102/54 100 %      Temp Source Heart Rate Source Patient Position - Orthostatic VS BP Location FiO2 (%)   Tympanic Monitor Lying Left arm --      Pain Score       Worst Possible Pain        Wt Readings from Last 1 Encounters:   08/01/18 81 6 kg (180 lb)       Abnormal Labs/Diagnostic   HCG, Quant 29,604 (H) mIU/mL     Cocaine Urine Positive (A)         Opiate Urine Positive (A)       THC Urine Positive (A)     RBC, UA 2-4 (A) /hpf           WBC, UA 10-20 (A) /hpf         Epithelial Cells Moderate (A) /hpf         Bacteria, UA Moderate (A) /hpf         Hyaline Casts, UA 2-4 (A) /lpf      WBC 35 93 (HH) Thousand/uL     Sodium 130 (L) mmol/L           Potassium 3 0 (L) mmol/L         Chloride 95 (L) mmol/L         Anion Gap 14 (H) mmol/L      Glucose 189 (H) mg/dL   cxr  No acute cardiopulmonary disease      ED Treatment:   Medication Administration from 08/01/2018 0207 to 08/01/2018 0492       Date/Time Order Dose Route Action Action by Comments     08/01/2018 0241 ondansetron (ZOFRAN) injection 4 mg 4 mg Intravenous Given Mckinley Momin RN      08/01/2018 0428 piperacillin-tazobactam (ZOSYN) IVPB 3 375 g 0 g Intravenous Stopped Ronald Cortez RN      08/01/2018 0402 piperacillin-tazobactam (ZOSYN) IVPB 3 375 g 3 375 g Intravenous [address] Jessica Blanco, RN      08/01/2018 0536 sodium chloride 0 9 % bolus 1,000 mL 0 mL Intravenous Stopped Sherrye Phlegm, RN      08/01/2018 0345 sodium chloride 0 9 % bolus 1,000 mL 1,000 mL Intravenous [address] [facility]      08/01/2018 0630 sodium chloride 0 9 % bolus 1,000 mL 0 mL Intravenous Stopped Sherrye Phlegm, RN      08/01/2018 0536 sodium chloride 0 9 % bolus 1,000 mL 1,000 mL Intravenous [address] Sherrye Phlegm, RN      08/01/2018 0535 thiamine (VITAMIN B1) tablet 100 mg 100 mg Oral Given Sherrye Phlegm, RN      02/05/8920 8922 folic acid (FOLVITE) tablet 1 mg 1 mg Oral Given Sherrye Phlegm, RN           Past Medical/Surgical History: Active Ambulatory Problems     Diagnosis Date Noted    No Active Ambulatory Problems     Resolved Ambulatory Problems     Diagnosis Date Noted    No Resolved Ambulatory Problems     Past Medical History:   Diagnosis Date    Psychiatric disorder        Admitting Diagnosis: Alcohol abuse [F10 10]  Overdose [T50 901A]  Aspiration pneumonia ([facility] [address] ) [J69 0]  Chest pain [R07 9]  Less than 8 weeks gestation of pregnancy [Z3A 01]  Bipolar affective disorder, remission status unspecified ([facility] [address] ) [F31 9]    Age/Sex: 29 y o  female    Assessment/Plan:   Drug overdose   Assessment & Plan     Pt reports that she was using crack/heroin and passed out  Regained consciousness after Narcan  Reports this was not intentional but does report worsening depression/bipolar symptoms  Will admit to Medicine, monitor on telemetry, hold any sedative medications and consult Psychiatry service    Chest pain   Assessment & Plan     Now resolved  Initial troponin was WNL  Given cocaine use, will monitor on telemetry and continue to trend    Hyponatremia   Assessment & Plan     Likely secondary to vomiting  Will hydrate gently and repeat BMP later today    Bipolar disorder [facility])   Assessment & Plan     Currently on Thorazine, Zoloft and Trazodone    Will hold for now until Psychiatry service evaluates pt    Pregnancy   Assessment & Plan     Pt reports contemplating   She also reports some vaginal spotting since yesterday  Discussed case with Dr Selin Pal who recommends checking a beta hcg quantitative level for now  No further recommendations at this time    Alcohol abuse   Assessment & Plan     Will order thiamine/folic acid and Ativan prn  Psychiatry service consulted as above    Leukocytosis   Assessment & Plan     CXR with possible right lung infiltrate (offical read pending)  She was given Zosyn in the ED  Will start on Unasyn for now to cover for Aspiration Pneumonia and check Procalcitonin, urine strep and legionella  Hypokalemia   Assessment & Plan     Likely secondary to vomiting  Replacement ordered  Will follow up K level later today      PSYCH CONSULT  Diagnosis:  Bipolar disorder mixed type  Anxiety  Drugs and alcohol abuse  Borderline personality disorder     Plan:  No need for inpatient psychiatric care since patient is not suicidal  Patient is not on one-to-one suicidal precaution  I will recommend inpatient drug alcohol rehab but patient is refusing  I will order her routine psychotropic medications at her request even though she is pregnant but she is planning to terminate her pregnancy  I discussed in detail with the patient and she is fully aware that medications can harm the baby but she is going to terminate this pregnancy and she wants all her medications be ordered  I will order all her psychotropic medications at her request after discussing the risk versus the benefit  Zoloft 100 mg daily  Neurontin 600 mg t i d   Trazodone 200 mg HS  Thorazine 200 mg HS  Hydroxyzine 50 mg HS  Psychotherapy  Patient will follow up with her psychiatrist and therapist at [facility] after the discharge  Discussed with nurse  I will follow up during the hospital stay        Admission Orders:  TELE MON  1:1 [address] [facility],[address] [facility] PRECAUTIONS  CONSULT PSYCHIATRY   CONSULT GYN OBS  SERIAL TROPONIN  PROCALCITONIN BMP  DAILY WEIGHT I/O  Scheduled Meds:   Current Facility-Administered Medications:  ampicillin-sulbactam 3 g Intravenous Q6H Sue Coon MD    folic acid 1 mg Oral Daily Sue Coon MD    sodium chloride 50 mL/hr Intravenous Continuous Sue Coon MD Last Rate: 50 mL/hr (08/01/18 0850)   thiamine 100 mg Oral Daily Sue Coon MD      Continuous Infusions:   sodium chloride 50 mL/hr Last Rate: 50 mL/hr (08/01/18 0850)     PRN Meds:

## 2018-08-01 NOTE — SOCIAL WORK
DASH discussion completed  Discussed goals of making sure pt's needs are met upon discharge, pt's preferences are taken into account, pt understands her health condition, medications and symptoms to watch for after returning home and pt is aware of any follow up appointments recommended by hospital physician  CM spoke with the pt at the bedside  Her baseline function is independent and no DME or HHC  Pt lives with her father and she does not drive  Currently she uses the [facility] in [place], [place] for her medication  Pt was seen by [facility] [id number] manager who will be managing her placement from the hospital once she is medically clear to go  She will be touching base with the PCP re: medical needs and clearance d/t pregnancy/OB eval   PCP provided the [facility] Garrett Wong Mgr contact information to discuss going forward  CM available to assist as needed

## 2018-08-01 NOTE — PLAN OF CARE
Problem: DISCHARGE PLANNING - CARE MANAGEMENT  Goal: Discharge to post-acute care or home with appropriate resources  INTERVENTIONS:  - Conduct assessment to determine patient/family and health care team treatment goals, and need for post-acute services based on payer coverage, community resources, and patient preferences, and barriers to discharge  - Address psychosocial, clinical, and financial barriers to discharge as identified in assessment in conjunction with the patient/family and health care team  - Arrange appropriate level of post-acute services according to patient's   needs and preference and payer coverage in collaboration with the physician and health care team  - Communicate with and update the patient/family, physician, and health care team regarding progress on the discharge plan  - Arrange appropriate transportation to post-acute venues  Return to home vs inpt psych care/committment     Outcome: Progressing

## 2018-08-01 NOTE — ASSESSMENT & PLAN NOTE
Patient also with anxiety, Borderline personality disorder  Patient seen by Psychiatry     Patient was made aware by the psychiatrist that her routine psychotropic medications can harm the baby but she stated that she is planning to terminate her pregnancy and wanted all her medications be ordered  Continue home medications

## 2018-08-01 NOTE — CONSULTS
Consultation - Kathi Robles 29 y o  female MRN: [id number]    Unit/Bed#: [address] [place] [address] Encounter: [id number]      Identifying Data:  29years old white female is admitted at [facility] earlier today after she was brought to the hospital psychiatric consultation is asked for bipolar disorder and alcohol drug abuse patient examined spoke with the nurse history physical medications labs reviewed and noted admitting note reviewed and noted patient reports that she is 6 weeks pregnant and she is going to abort she does not want the child and she does not have any children  Patient is vomiting and feeling sick her face is swollen and very reasons all over the face she said she has been self-mutilating herself she is not suicidal she does not need psychiatric admission she does not need rehab she is under the psychiatric treatment at local [facility] to see a psychiatrist and she was able to tell me the medications list Zoloft 100 mg daily Neurontin 800 mg 3 times a day trazodone 200 mg HS Thorazine 200 mg HS and hydroxyzine 50 mg HS  Patient also demands to order all her medicine even she is pregnant she reports that she is not going to keep the baby and she needs her medications she tried to go off the medication after being pregnant but she could not do it then she decided to continue her medications her psychiatrist is aware of this  Patient also reports that she injected 4 bags of heroin also say smoked crack she has been smoking pot and she is a binge drinker after she passed out her father gave her narcan she woke up and brought her to the hospital she is admitted for further treatment and stabilization  I discussed with the nurse at length she also reported that patient is pregnant and she is asking for her psychotropic medications and she is going to terminate her pregnancy and she wants to continue her medications    At this point patient looks very sick she is vomiting and anxious nervous shaky asking to order her medications again and again  Patient is not on one-to-one suicidal precautions since she is not suicidal             Patient is single she lives with her father she smokes cigarette she drink alcohol but she has no DUI she has an extensive history of abusing all kind of drugs as described above no DUIs but she had a 7 rehabs in the past and she is still abusing drugs patient has 11th grade education and then she obtained GED patient has no children and she is not working patient is suffering from bipolar disorder drugs and alcohol abuse borderline personality disorder self mutilating behavior  No known medication allergy    Chief Complaints:  Drug overdose    Family History: History reviewed  No pertinent family history  Mom and dad have a depression    Legal History:  Patient reports that she had been in alf 11 times in the past currently she is not on probation  Mental Status Exam:  29years old white female is anxious rest last lying in the bed talkative pressured speech she is all over the place  memory intact  Severe mood swings racing thoughts pressured speech cannot concentrate demanding needy domineering during the interview keeps complaining of feeling sick and vomiting at times during the session  Patient is severely clifton labile manipulator very difficult  Demanding for the medications  Patient denies auditory or visual hallucinations  Patient denies suicidal or homicidal ideations  Affect anxious not lethargic guarded paranoid poor concentration  Insight and judgments are adequate  She is able to understand that she has a drugs and alcohol problem and she is suffering from mental illness bipolar disorder and borderline personality disorder  History of Present Illness     HPI: Frankey Meals is a 29y o  year old female who presents with drug overdose      Consults      Historical Information   Past Psychiatric History:  Patient is partially cooperative but she is able to give out basic background history she reports that she has a long history of bipolar disorder and borderline personality disorder she has at least 2 psychiatric admissions in the past and 2 suicide attempts in the past currently she is at [facility] under the psychiatric treatment she has a psychiatrist who prescribes her medications patient also have an extensive history of alcohol and drug abuse with at least 7 rehabs in the past she denies DUI  Though she is still abusing drugs and alcohol she gives a history of being in [facility] 11 times in the past currently she is not on probation  Patient is very difficult he noncompliance  The  Past Medical History:   Diagnosis Date    Psychiatric disorder     Bipolar 1 ,borderline personality disorder     History reviewed  No pertinent surgical history    Social History   History   Alcohol Use    Yes     Comment: occas     History   Drug Use    Types: Heroin, Marijuana, Cocaine     Comment:  OD in May,now clean from heroin,still smokes marijuana few times per week     History   Smoking Status    Current Every Day Smoker    Packs/day: 1 00   Smokeless Tobacco    Never Used       Meds/Allergies   current meds:   Current Facility-Administered Medications   Medication Dose Route Frequency    ampicillin-sulbactam (UNASYN) 3 g in sodium chloride 0 9 % 100 mL IVPB  3 g Intravenous I9A    folic acid (FOLVITE) tablet 1 mg  1 mg Oral Daily    sodium chloride 0 9 % infusion  50 mL/hr Intravenous Continuous    thiamine (VITAMIN B1) tablet 100 mg  100 mg Oral Daily    and PTA meds:    Prescriptions Prior to Admission   Medication    chlorproMAZINE (THORAZINE) 200 mg tablet    gabapentin (NEURONTIN) 300 mg capsule    hydrOXYzine pamoate (VISTARIL) 50 mg capsule    sertraline (ZOLOFT) 100 mg tablet    traZODone (DESYREL) 100 mg tablet    miconazole (MONISTAT 7) 100 mg vaginal suppository     No Known Allergies    Objective   Vitals: Blood pressure 114/59, pulse 68, temperature 98 2 °F (36 8 °C), temperature source Oral, resp  rate 18, height 5' 6" (1 676 m), weight 81 6 kg (179 lb 14 3 oz), SpO2 93 %, not currently breastfeeding  Routine Lab Results:   Admission on 08/01/2018   Component Date Value Ref Range Status    Sodium 08/01/2018 130* 136 - 145 mmol/L Final    Potassium 08/01/2018 3 0* 3 5 - 5 3 mmol/L Final    Chloride 08/01/2018 95* 100 - 108 mmol/L Final    CO2 08/01/2018 21  21 - 32 mmol/L Final    Anion Gap 08/01/2018 14* 4 - 13 mmol/L Final    BUN 08/01/2018 16  5 - 25 mg/dL Final    Creatinine 08/01/2018 1 26  0 60 - 1 30 mg/dL Final    Standardized to IDMS reference method    Glucose 08/01/2018 189* 65 - 140 mg/dL Final      If the patient is fasting, the ADA then defines impaired fasting glucose as > 100 mg/dL and diabetes as > or equal to 123 mg/dL  Specimen collection should occur prior to Sulfasalazine administration due to the potential for falsely depressed results  Specimen collection should occur prior to Sulfapyridine administration due to the potential for falsely elevated results   Calcium 08/01/2018 8 9  8 3 - 10 1 mg/dL Final    AST 08/01/2018 29  5 - 45 U/L Final      Specimen collection should occur prior to Sulfasalazine administration due to the potential for falsely depressed results   ALT 08/01/2018 26  12 - 78 U/L Final      Specimen collection should occur prior to Sulfasalazine administration due to the potential for falsely depressed results   Alkaline Phosphatase 08/01/2018 59  46 - 116 U/L Final    Total Protein 08/01/2018 7 9  6 4 - 8 2 g/dL Final    Albumin 08/01/2018 4 0  3 5 - 5 0 g/dL Final    Total Bilirubin 08/01/2018 0 40  0 20 - 1 00 mg/dL Final    eGFR 08/01/2018 58  ml/min/1 73sq m Final    WBC 08/01/2018 35 93* 4 31 - 10 16 Thousand/uL Final    Results verified by repeat   This result has been called to Deann Ahn RN by Cecilia Brock Maikol on 08 01 2018 at [id number] Harrison Cannon, and has been read back   RBC 08/01/2018 4 02  3 81 - 5 12 Million/uL Final    Hemoglobin 08/01/2018 12 8  11 5 - 15 4 g/dL Final    Hematocrit 08/01/2018 37 0  34 8 - 46 1 % Final    MCV 08/01/2018 92  82 - 98 fL Final    MCH 08/01/2018 31 8  26 8 - 34 3 pg Final    MCHC 08/01/2018 34 6  31 4 - 37 4 g/dL Final    RDW 08/01/2018 13 3  11 6 - 15 1 % Final    MPV 08/01/2018 9 0  8 9 - 12 7 fL Final    Platelets 78/65/3069 338  149 - 390 Thousands/uL Final    nRBC 08/01/2018 0  /100 WBCs Final    Color, UA 08/01/2018 Yellow   Final    Clarity, UA 08/01/2018 Cloudy   Final    Specific Gravity, UA 08/01/2018 >=1 030  1 000 - 1 030 Final    pH, UA 08/01/2018 5 0  5 0 - 9 0 Final    Leukocytes, UA 08/01/2018 Trace* Negative Final    Nitrite, UA 08/01/2018 Negative  Negative Final    Protein, UA 08/01/2018 30 (1+)* Negative mg/dl Final    Glucose, UA 08/01/2018 >=1000 (1%)* Negative mg/dl Final    Ketones, UA 08/01/2018 15 (1+)* Negative mg/dl Final    Urobilinogen, UA 08/01/2018 0 2  0 2, 1 0 E U /dl E U /dl Final    Bilirubin, UA 08/01/2018 Negative  Negative Final    Blood, UA 08/01/2018 Small* Negative Final    EXT PREG TEST UR (Ref: Negative) 08/01/2018 positive   Final    Troponin I 08/01/2018 <0 02  <=0 04 ng/mL Final    3Autovalidation override  [employer] Chemistry analyzer 99% cutoff is > 0 04 ng/mL in network labs     o cTnI 99% cutoff is useful only when applied to patients in the clinical setting of myocardial ischemia   o cTnI 99% cutoff should be interpreted in the context of clinical history, ECG findings and possibly cardiac imaging to establish correct diagnosis  o cTnI 99% cutoff may be suggestive but clearly not indicative of a coronary event without the clinical setting of myocardial ischemia        Ethanol Lvl 08/01/2018 <3  0 - 3 mg/dL Final    Lipase 08/01/2018 72* 73 - 393 u/L Final    RBC, UA 08/01/2018 2-4* None Seen, 0-5 /hpf Final    WBC, UA 08/01/2018 10-20* None Seen, 0-5, 5-55, 5-65 /hpf Final    Epithelial Cells 08/01/2018 Moderate* None Seen, Occasional /hpf Final    Bacteria, UA 08/01/2018 Moderate* None Seen, Occasional /hpf Final    Hyaline Casts, UA 08/01/2018 2-4* (none) /lpf Final    Segmented % 08/01/2018 83* 43 - 75 % Final    Bands % 08/01/2018 11* 0 - 8 % Final    Lymphocytes % 08/01/2018 2* 14 - 44 % Final    Monocytes % 08/01/2018 4  4 - 12 % Final    Eosinophils % 08/01/2018 0  0 - 6 % Final    Basophils % 08/01/2018 0  0 - 1 % Final    Absolute Neutrophils 08/01/2018 33 77* 1 85 - 7 62 Thousand/uL Final    Lymphocytes Absolute 08/01/2018 0 72  0 60 - 4 47 Thousand/uL Final    Monocytes Absolute 08/01/2018 1 44* 0 00 - 1 22 Thousand/uL Final    Eosinophils Absolute 08/01/2018 0 00  0 00 - 0 40 Thousand/uL Final    Basophils Absolute 08/01/2018 0 00  0 00 - 0 10 Thousand/uL Final    Total Counted 08/01/2018 100   Final    Toxic Granulation 08/01/2018 Present   Final    RBC Morphology 08/01/2018 Normal   Final    Platelet Estimate 51/64/0143 Adequate  Adequate Final    LACTIC ACID 08/01/2018 1 0  0 5 - 2 0 mmol/L Final    Amph/Meth UR 08/01/2018 Negative  Negative Final    Barbiturate Ur 08/01/2018 Negative  Negative Final    Benzodiazepine Urine 08/01/2018 Negative  Negative Final    Cocaine Urine 08/01/2018 Positive* Negative Final    Methadone Urine 08/01/2018 Negative  Negative Final    Opiate Urine 08/01/2018 Positive* Negative Final    PCP Ur 08/01/2018 Negative  Negative Final    THC Urine 08/01/2018 Positive* Negative Final    Magnesium 08/01/2018 2 0  1 6 - 2 6 mg/dL Final    HCG, Quant 08/01/2018 16807* <=6 mIU/mL Final         Diagnosis:  Bipolar disorder mixed type  Anxiety  Drugs and alcohol abuse  Borderline personality disorder    Plan:  No need for inpatient psychiatric care since patient is not suicidal  Patient is not on one-to-one suicidal precaution    I will recommend inpatient drug alcohol rehab but patient is refusing  I will order her routine psychotropic medications at her request even though she is pregnant but she is planning to terminate her pregnancy  I discussed in detail with the patient and she is fully aware that medications can harm the baby but she is going to terminate this pregnancy and she wants all her medications be ordered  I will order all her psychotropic medications at her request after discussing the risk versus the benefit  Zoloft 100 mg daily  Neurontin 600 mg t i d   Trazodone 200 mg HS  Thorazine 200 mg HS  Hydroxyzine 50 mg HS  Psychotherapy  Patient will follow up with her psychiatrist and therapist at [facility] after the discharge  Discussed with nurse  I will follow up during the hospital stay      Zaida Leonard MD

## 2018-08-01 NOTE — PROGRESS NOTES
Dr Glynn Mccartney paged to alert MD that patient refused all blood work  Patient stated " No bloodwork, I am 6 weeks pregnant"  This nurse attempted to educate patient on the reason for specific blood work ordered  Patient still refused  MD will visit patient to educate as well

## 2018-08-01 NOTE — ASSESSMENT & PLAN NOTE
Repleted  Patient refused IV potassium replacement  Daily potassium chloride started on discharge   Repeat BMP as outpatient and follow up with PCP to discuss if potassium supplement can be stopped

## 2018-08-01 NOTE — ASSESSMENT & PLAN NOTE
She stated that she is contemplating    She had also reported some vaginal spotting previously but denied today  Beta HCG quantitative level was 29,604  Patient refused ultrasound and evaluation by Dr Pallavi Haq   She can follow up outpatient for pregnancy termination if she chooses

## 2018-08-01 NOTE — ED PROVIDER NOTES
History  Chief Complaint   Patient presents with    Overdose - Accidental     patient admits to smoking cocaine and shooting up "alot of heroin" unsure of how many bags, parents gave 8mg of narcan, is pregnant states is getting an  today, admits to picking at skin     Pt in ER with medics  She was found obtunded by her parents and was given 8mg IN Narcan  Pt now awake and alert, c/o chest pain and nausea  She admits to a 2 day crack cocaine binge, and then started using IV heroin tonight  Pt is approx 6wks pregnant and states that she was going to get an  on Tues, but didn't get around to it  Prior to Admission Medications   Prescriptions Last Dose Informant Patient Reported? Taking?   chlorproMAZINE (THORAZINE) 200 mg tablet   Yes No   Sig: Take 200 mg by mouth Medrol Dose Pack scheduling ONLY   gabapentin (NEURONTIN) 300 mg capsule  Self Yes No   Sig: Take 600 mg by mouth 3 (three) times a day     hydrOXYzine pamoate (VISTARIL) 50 mg capsule  Self Yes No   Sig: Take 50 mg by mouth every evening   miconazole (MONISTAT 7) 100 mg vaginal suppository   No No   Sig: Insert 1 suppository (100 mg total) into the vagina daily at bedtime   sertraline (ZOLOFT) 100 mg tablet   Yes No   Sig: Take 100 mg by mouth daily   traZODone (DESYREL) 100 mg tablet   Yes No   Sig: Take 200 mg by mouth daily at bedtime      Facility-Administered Medications: None       Past Medical History:   Diagnosis Date    Psychiatric disorder     Bipolar 1 ,borderline personality disorder       History reviewed  No pertinent surgical history  History reviewed  No pertinent family history  I have reviewed and agree with the history as documented  Social History   Substance Use Topics    Smoking status: Current Every Day Smoker     Packs/day: 1 00    Smokeless tobacco: Never Used    Alcohol use Yes      Comment: occas        Review of Systems   Constitutional: Negative for chills and fever     Cardiovascular: Positive for chest pain  Gastrointestinal: Positive for nausea and vomiting  Negative for abdominal pain  Genitourinary: Negative for difficulty urinating  All other systems reviewed and are negative  Physical Exam  Physical Exam   Constitutional: She appears well-developed and well-nourished  No distress  HENT:   Head: Normocephalic and atraumatic  Eyes: Conjunctivae are normal  Pupils are equal, round, and reactive to light  Neck: Normal range of motion  Neck supple  Cardiovascular: Normal rate, regular rhythm and normal heart sounds  No murmur heard  Pulmonary/Chest: No respiratory distress  She has no wheezes  She has rhonchi in the right upper field, the right middle field, the right lower field, the left upper field and the left middle field  Abdominal: Soft  Bowel sounds are normal  She exhibits no distension  There is no tenderness  Musculoskeletal: Normal range of motion  She exhibits no edema or deformity  Neurological: She is alert  No cranial nerve deficit  Skin: Skin is warm and dry  She is not diaphoretic  Multiple lesions on face, which pt attributes to self mutilation (picking)   Psychiatric: Her mood appears anxious  Her speech is not slurred  She is agitated  Thought content is paranoid  Nursing note and vitals reviewed        Vital Signs  ED Triage Vitals [08/01/18 0210]   Temperature Pulse Respirations Blood Pressure SpO2   99 1 °F (37 3 °C) 96 (!) 28 102/54 100 %      Temp Source Heart Rate Source Patient Position - Orthostatic VS BP Location FiO2 (%)   Tympanic Monitor Lying Left arm --      Pain Score       Worst Possible Pain           Vitals:    08/01/18 0357 08/01/18 0530 08/01/18 0538 08/01/18 0545   BP: 109/69  133/62 133/62   Pulse: 76 72 60 62   Patient Position - Orthostatic VS: Lying          Visual Acuity      ED Medications  Medications   thiamine (VITAMIN B1) tablet 100 mg (100 mg Oral Given 9/7/82 7016)   folic acid (FOLVITE) tablet 1 mg (1 mg Oral Given 8/1/18 0535)   ondansetron (ZOFRAN) injection 4 mg (4 mg Intravenous Given 8/1/18 0241)   piperacillin-tazobactam (ZOSYN) IVPB 3 375 g (0 g Intravenous Stopped 8/1/18 0428)   sodium chloride 0 9 % bolus 1,000 mL (0 mL Intravenous Stopped 8/1/18 0536)   sodium chloride 0 9 % bolus 1,000 mL (0 mL Intravenous Stopped 8/1/18 0630)       Diagnostic Studies  Results Reviewed     Procedure Component Value Units Date/Time    hCG, quantitative [[id number]]  (Abnormal) Collected:  08/01/18 0538    Lab Status:  Final result Specimen:  Blood from Line, Venous Updated:  08/01/18 0617     HCG, Quant 29,604 (H) mIU/mL     Narrative:          Expected Ranges:     Approximate               Approximate HCG  Gestation age          Concentration ( mIU/mL)  _____________          ______________________   Fany Nolasco                      HCG values  0 2-1                       5-50  1-2                           2-3                         100-5000  3-4                         500-99832  4-5                         1000-75242  5-6                         14250-550496  6-8                         48999-471150  8-12                        22712-919844    Rapid drug screen, urine [[id number]]  (Abnormal) Collected:  08/01/18 0244    Lab Status:  Final result Specimen:  Urine Updated:  08/01/18 0504     Amph/Meth UR Negative     Barbiturate Ur Negative     Benzodiazepine Urine Negative     Cocaine Urine Positive (A)     Methadone Urine Negative     Opiate Urine Positive (A)     PCP Ur Negative     THC Urine Positive (A)    Narrative:         Presumptive report  If requested, specimen will be sent to reference lab for confirmation  FOR MEDICAL PURPOSES ONLY  IF CONFIRMATION NEEDED PLEASE CONTACT THE LAB WITHIN 5 DAYS      Drug Screen Cutoff Levels:  AMPHETAMINE/METHAMPHETAMINES  1000 ng/mL  BARBITURATES     200 ng/mL  BENZODIAZEPINES     200 ng/mL  COCAINE      300 ng/mL  METHADONE      300 ng/mL  OPIATES      300 ng/mL  PHENCYCLIDINE     25 ng/mL  THC       50 ng/mL    Magnesium [[id number]]  (Normal) Collected:  08/01/18 0243    Lab Status:  Final result Specimen:  Blood from Line, Venous Updated:  08/01/18 0503     Magnesium 2 0 mg/dL     Lactic acid, plasma [[id number]]  (Normal) Collected:  08/01/18 0355    Lab Status:  Final result Specimen:  Blood from Hand, Left Updated:  08/01/18 0422     LACTIC ACID 1 0 mmol/L     Narrative:         Result may be elevated if tourniquet was used during collection      Ethanol [[id number]]  (Normal) Collected:  08/01/18 0243    Lab Status:  Final result Specimen:  Blood from Line, Venous Updated:  08/01/18 0324     Ethanol Lvl <3 mg/dL     Urine Microscopic [[id number]]  (Abnormal) Collected:  08/01/18 0247    Lab Status:  Final result Specimen:  Urine from Urine, Other Updated:  08/01/18 0323     RBC, UA 2-4 (A) /hpf      WBC, UA 10-20 (A) /hpf      Epithelial Cells Moderate (A) /hpf      Bacteria, UA Moderate (A) /hpf      Hyaline Casts, UA 2-4 (A) /lpf     CBC and differential [[id number]]  (Abnormal) Collected:  08/01/18 0243    Lab Status:  Final result Specimen:  Blood from Line Updated:  08/01/18 0322     WBC 35 93 (HH) Thousand/uL      RBC 4 02 Million/uL      Hemoglobin 12 8 g/dL      Hematocrit 37 0 %      MCV 92 fL      MCH 31 8 pg      MCHC 34 6 g/dL      RDW 13 3 %      MPV 9 0 fL      Platelets 732 Thousands/uL      nRBC 0 /100 WBCs     Troponin I [[id number]]  (Normal) Collected:  08/01/18 0243    Lab Status:  Final result Specimen:  Blood from Line, Venous Updated:  08/01/18 0309     Troponin I <0 02 ng/mL     Comprehensive metabolic panel [[id number]]  (Abnormal) Collected:  08/01/18 0243    Lab Status:  Final result Specimen:  Blood from Line, Venous Updated:  08/01/18 0307     Sodium 130 (L) mmol/L      Potassium 3 0 (L) mmol/L      Chloride 95 (L) mmol/L      CO2 21 mmol/L      Anion Gap 14 (H) mmol/L      BUN 16 mg/dL      Creatinine 1 26 mg/dL      Glucose 189 (H) mg/dL Calcium 8 9 mg/dL      AST 29 U/L      ALT 26 U/L      Alkaline Phosphatase 59 U/L      Total Protein 7 9 g/dL      Albumin 4 0 g/dL      Total Bilirubin 0 40 mg/dL      eGFR 58 ml/min/1 73sq m     Narrative:         National Kidney Disease Education Program recommendations are as follows:  GFR calculation is accurate only with a steady state creatinine  Chronic Kidney disease less than 60 ml/min/1 73 sq  meters  Kidney failure less than 15 ml/min/1 73 sq  meters  Lipase [[id number]]  (Abnormal) Collected:  08/01/18 0243    Lab Status:  Final result Specimen:  Blood from Line, Venous Updated:  08/01/18 0307     Lipase 72 (L) u/L     UA w Reflex to Microscopic [[id number]]  (Abnormal) Collected:  08/01/18 0247    Lab Status:  Final result Specimen:  Urine from Urine, Other Updated:  08/01/18 0258     Color, UA Yellow     Clarity, UA Cloudy     Specific Gravity, UA >=1 030     pH, UA 5 0     Leukocytes, UA Trace (A)     Nitrite, UA Negative     Protein, UA 30 (1+) (A) mg/dl      Glucose, UA >=1000 (1%) (A) mg/dl      Ketones, UA 15 (1+) (A) mg/dl      Urobilinogen, UA 0 2 E U /dl      Bilirubin, UA Negative     Blood, UA Small (A)    POCT pregnancy, urine [[id number]]  (Normal) Resulted:  08/01/18 0250    Lab Status:  Final result Updated:  08/01/18 0250     EXT PREG TEST UR (Ref: Negative) positive                 XR chest 1 view portable   Final Result by Rafa Singh DO (08/01 4676)      No acute cardiopulmonary disease              Workstation performed: [id number]                    Procedures  ECG 12 Lead Documentation  Date/Time: 8/1/2018 2:14 AM  Performed by: Jeff Villa by: Divina Khan     Indications / Diagnosis:  Chest pain  ECG reviewed by me, the ED Provider: yes    Patient location:  ED  Previous ECG:     Previous ECG:  Unavailable    Comparison to cardiac monitor: Yes    Interpretation:     Interpretation: normal    Rate:     ECG rate:  85bpm    ECG rate assessment: normal    Rhythm:     Rhythm: sinus rhythm    CriticalCare Time  Performed by: Luis Daniel Raman  Authorized by: Luis Daniel Raman     Critical care provider statement:     Critical care time (minutes):  35    Critical care time was exclusive of:  Separately billable procedures and treating other patients and teaching time    Critical care was necessary to treat or prevent imminent or life-threatening deterioration of the following conditions:  Respiratory failure and sepsis           Phone Contacts  ED Phone Contact    ED Course                               MDM  Number of Diagnoses or Management Options  Aspiration pneumonia [facility]):   Chest pain:   Overdose:   Diagnosis management comments: Concern for aspiration pneumonia with leukocytosis  Will give empriric abx - Zosyn        Amount and/or Complexity of Data Reviewed  Clinical lab tests: ordered and reviewed  Tests in the radiology section of CPT®: ordered and reviewed    Risk of Complications, Morbidity, and/or Mortality  Presenting problems: moderate  Diagnostic procedures: moderate  Management options: moderate    Patient Progress  Patient progress: stable    The patient presented with a condition in which there was a high probability of imminent or life-threatening deterioration, and critical care services (excluding separately billable procedures) totalled 30-74 minutes          Disposition  Final diagnoses:   Aspiration pneumonia (HCC)   Overdose   Chest pain     Time reflects when diagnosis was documented in both MDM as applicable and the Disposition within this note     Time User Action Codes Description Comment    8/1/2018  4:37 AM Arman Rumble O Add [J69 0] Aspiration pneumonia ([address] )     8/1/2018  4:37 AM Arman Rumble O Add [T50 901A] Overdose     8/1/2018  4:39 AM Arman Rumble O Add [R07 9] Chest pain     8/1/2018  5:19 AM Aaron Sabal A Modify [R07 9] Chest pain     8/1/2018  5:19 AM Aaron Sabal A Add [F10 10] Alcohol abuse     8/1/2018  5:19 AM Dion Allegra A Modify [R07 9] Chest pain     8/1/2018  5:19 AM Dion Allegra A Modify [F10 10] Alcohol abuse     8/1/2018  5:19 AM Dion Allegra A Modify [R07 9] Chest pain     8/1/2018  5:20 AM Dion Allegra A Modify [R07 9] Chest pain     8/1/2018  5:20 AM Dion Allegra A Add [F31 9] Bipolar affective disorder, remission status unspecified ([facility] )     8/1/2018  5:20 AM Dion Allegra A Modify [R07 9] Chest pain     8/1/2018  5:20 AM Dion Allegra A Modify [R07 9] Chest pain     8/1/2018  5:20 AM Dion Allegra A Modify [F31 9] Bipolar affective disorder, remission status unspecified ([facility] )     8/1/2018  5:20 AM Dion Allegra A Modify [R07 9] Chest pain     8/1/2018  5:26 AM Dion Allegra A Add [Z3A 01] Less than 8 weeks gestation of pregnancy     8/1/2018  5:26 AM Dion Allegra A Modify [Z3A 01] Less than 8 weeks gestation of pregnancy       ED Disposition     ED Disposition Condition Comment    Admit  Case was discussed with Dr Rebeca Putnam and the patient's admission status was agreed to be Admission Status: inpatient status to the service of Dr Rebeca Putnam   Follow-up Information    None         Current Discharge Medication List      CONTINUE these medications which have NOT CHANGED    Details   chlorproMAZINE (THORAZINE) 200 mg tablet Take 200 mg by mouth Medrol Dose Pack scheduling ONLY      gabapentin (NEURONTIN) 300 mg capsule Take 600 mg by mouth 3 (three) times a day        hydrOXYzine pamoate (VISTARIL) 50 mg capsule Take 50 mg by mouth every evening      miconazole (MONISTAT 7) 100 mg vaginal suppository Insert 1 suppository (100 mg total) into the vagina daily at bedtime  Qty: 7 suppository, Refills: 0    Associated Diagnoses: Urinary tract infection      sertraline (ZOLOFT) 100 mg tablet Take 100 mg by mouth daily      traZODone (DESYREL) 100 mg tablet Take 200 mg by mouth daily at bedtime           No discharge procedures on file      ED Provider  Electronically Signed by Charis Grant,   08/01/18 0740

## 2018-08-01 NOTE — PHYSICIAN ADVISOR
Current patient class: Inpatient  The patient is currently on Hospital Day: 1 at [address] [place] [address]      The patient was admitted to the hospital at [phone] on 18 for the following diagnosis:  Alcohol abuse [F10 10]  Overdose [T50 901A]  Aspiration pneumonia ([facility] ) [J69 0]  Chest pain [R07 9]  Less than 8 weeks gestation of pregnancy [Z3A 01]  Bipolar affective disorder, remission status unspecified ([facility] ) [F31 9]       There is documentation in the medical record of an expected length of stay of at least 2 midnights  The patient is therefore expected to satisfy the 2 midnight benchmark and given the 2 midnight presumption is appropriate for INPATIENT ADMISSION  Given this expectation of a satisfying stay, CMS instructs us that the patient is most often appropriate for inpatient admission under part A provided medical necessity is documented in the chart  After review of the relevant documentation, labs, vital signs and test results, the patient is appropriate for INPATIENT ADMISSION  Admission to the hospital as an inpatient is a complex decision making process which requires the practitioner to consider the patients presenting complaint, history and physical examination and all relevant testing  With this in mind, in this case, the patient was deemed appropriate for INPATIENT ADMISSION  After review of the documentation and testing available at the time of the admission I concur with this clinical determination of medical necessity  Rationale is as follows:     The patient is a 29 yrs old Female who presented to the ED at 2018  2:40 AM with a chief complaint of Overdose - Accidental (patient admits to smoking cocaine and shooting up "alot of heroin" unsure of how many bags, parents gave 8mg of narcan, is pregnant states is getting an  today, admits to picking at skin)    The patients vitals on arrival were ED Triage Vitals [18 0210]   Temperature Pulse Respirations Blood Pressure SpO2   99 1 °F (37 3 °C) 96 (!) 28 102/54 100 %      Temp Source Heart Rate Source Patient Position - Orthostatic VS BP Location FiO2 (%)   Tympanic Monitor Lying Left arm --      Pain Score       Worst Possible Pain           Past Medical History:   Diagnosis Date    Psychiatric disorder     Bipolar 1 ,borderline personality disorder     History reviewed  No pertinent surgical history          Consults have been placed to:   IP CONSULT TO OB GYN  IP CONSULT TO PSYCHIATRY    Vitals:    08/01/18 0538 08/01/18 0545 08/01/18 0751 08/01/18 0759   BP: 133/62 133/62 114/59    BP Location:   Right arm    Pulse: 60 62 68    Resp: 22 20 18    Temp:   98 2 °F (36 8 °C)    TempSrc:   Oral    SpO2: 97% 94% 93%    Weight:    81 6 kg (179 lb 14 3 oz)   Height:           Most recent labs:    Recent Labs      08/01/18   0243   WBC  35 93*   HGB  12 8   HCT  37 0   PLT  338   K  3 0*   NA  130*   CALCIUM  8 9   BUN  16   CREATININE  1 26   LIPASE  72*   TROPONINI  <0 02   AST  29   ALT  26   ALKPHOS  59   BILITOT  0 40       Scheduled Meds:  Current Facility-Administered Medications:  acetaminophen 650 mg Oral Q6H PRN Jamila Hanley DO    ampicillin-sulbactam 3 g Intravenous Q6H Ngozi Ugalde MD Last Rate: 3 g (08/01/18 1528)   bacitracin 1 small application Topical BID Jamila Hanley DO    chlorproMAZINE 200 mg Oral HS Kathy Escalante MD    folic acid 1 mg Oral Daily Ngozi Ugalde MD    gabapentin 600 mg Oral TID Kathy Escalante MD    hydrOXYzine HCL 50 mg Oral HS Kathy Escalante MD    ondansetron 4 mg Intravenous Q6H PRN Jamila Hanley,     sertraline 100 mg Oral Daily Kathy Escalante MD    thiamine 100 mg Oral Daily Ngozi Ugalde MD      Continuous Infusions:   PRN Meds:   acetaminophen    ondansetron    Surgical procedures (if appropriate):

## 2018-08-02 ENCOUNTER — APPOINTMENT (INPATIENT)
Dept: RADIOLOGY | Facility: HOSPITAL | Age: 28
DRG: 886 | End: 2018-08-02
Payer: COMMERCIAL

## 2018-08-02 PROBLEM — R07.9 CHEST PAIN: Status: RESOLVED | Noted: 2018-08-01 | Resolved: 2018-08-02

## 2018-08-02 LAB
ANION GAP SERPL CALCULATED.3IONS-SCNC: 9 MMOL/L (ref 4–13)
BASOPHILS # BLD AUTO: 0.03 THOUSANDS/ΜL (ref 0–0.1)
BASOPHILS NFR BLD AUTO: 0 % (ref 0–1)
BUN SERPL-MCNC: 3 MG/DL (ref 5–25)
CALCIUM SERPL-MCNC: 8.3 MG/DL (ref 8.3–10.1)
CHLORIDE SERPL-SCNC: 101 MMOL/L (ref 100–108)
CO2 SERPL-SCNC: 27 MMOL/L (ref 21–32)
CREAT SERPL-MCNC: 0.64 MG/DL (ref 0.6–1.3)
EOSINOPHIL # BLD AUTO: 0.04 THOUSAND/ΜL (ref 0–0.61)
EOSINOPHIL NFR BLD AUTO: 0 % (ref 0–6)
ERYTHROCYTE [DISTWIDTH] IN BLOOD BY AUTOMATED COUNT: 13.6 % (ref 11.6–15.1)
GFR SERPL CREATININE-BSD FRML MDRD: 122 ML/MIN/1.73SQ M
GLUCOSE SERPL-MCNC: 92 MG/DL (ref 65–140)
HCT VFR BLD AUTO: 32.4 % (ref 34.8–46.1)
HGB BLD-MCNC: 11 G/DL (ref 11.5–15.4)
IMM GRANULOCYTES # BLD AUTO: 0.04 THOUSAND/UL (ref 0–0.2)
IMM GRANULOCYTES NFR BLD AUTO: 0 % (ref 0–2)
LYMPHOCYTES # BLD AUTO: 2.37 THOUSANDS/ΜL (ref 0.6–4.47)
LYMPHOCYTES NFR BLD AUTO: 20 % (ref 14–44)
MCH RBC QN AUTO: 31.8 PG (ref 26.8–34.3)
MCHC RBC AUTO-ENTMCNC: 34 G/DL (ref 31.4–37.4)
MCV RBC AUTO: 94 FL (ref 82–98)
MONOCYTES # BLD AUTO: 1.3 THOUSAND/ΜL (ref 0.17–1.22)
MONOCYTES NFR BLD AUTO: 11 % (ref 4–12)
NEUTROPHILS # BLD AUTO: 7.92 THOUSANDS/ΜL (ref 1.85–7.62)
NEUTS SEG NFR BLD AUTO: 69 % (ref 43–75)
NRBC BLD AUTO-RTO: 0 /100 WBCS
PLATELET # BLD AUTO: 260 THOUSANDS/UL (ref 149–390)
PMV BLD AUTO: 8.6 FL (ref 8.9–12.7)
POTASSIUM SERPL-SCNC: 2.9 MMOL/L (ref 3.5–5.3)
PROCALCITONIN SERPL-MCNC: 0.32 NG/ML
RBC # BLD AUTO: 3.46 MILLION/UL (ref 3.81–5.12)
SODIUM SERPL-SCNC: 137 MMOL/L (ref 136–145)
WBC # BLD AUTO: 11.7 THOUSAND/UL (ref 4.31–10.16)

## 2018-08-02 PROCEDURE — 85025 COMPLETE CBC W/AUTO DIFF WBC: CPT | Performed by: FAMILY MEDICINE

## 2018-08-02 PROCEDURE — 99232 SBSQ HOSP IP/OBS MODERATE 35: CPT | Performed by: FAMILY MEDICINE

## 2018-08-02 PROCEDURE — 84145 PROCALCITONIN (PCT): CPT | Performed by: FAMILY MEDICINE

## 2018-08-02 PROCEDURE — 80048 BASIC METABOLIC PNL TOTAL CA: CPT | Performed by: FAMILY MEDICINE

## 2018-08-02 RX ORDER — POTASSIUM CHLORIDE 20 MEQ/1
20 TABLET, EXTENDED RELEASE ORAL ONCE
Status: COMPLETED | OUTPATIENT
Start: 2018-08-02 | End: 2018-08-02

## 2018-08-02 RX ORDER — POTASSIUM CHLORIDE 14.9 MG/ML
20 INJECTION INTRAVENOUS ONCE
Status: DISCONTINUED | OUTPATIENT
Start: 2018-08-02 | End: 2018-08-02

## 2018-08-02 RX ORDER — HYDROXYZINE HYDROCHLORIDE 25 MG/1
50 TABLET, FILM COATED ORAL 2 TIMES DAILY PRN
Status: DISCONTINUED | OUTPATIENT
Start: 2018-08-02 | End: 2018-08-03 | Stop reason: HOSPADM

## 2018-08-02 RX ORDER — POTASSIUM CHLORIDE 20 MEQ/1
40 TABLET, EXTENDED RELEASE ORAL ONCE
Status: COMPLETED | OUTPATIENT
Start: 2018-08-02 | End: 2018-08-02

## 2018-08-02 RX ORDER — POTASSIUM CHLORIDE 29.8 MG/ML
40 INJECTION INTRAVENOUS ONCE
Status: DISCONTINUED | OUTPATIENT
Start: 2018-08-02 | End: 2018-08-02

## 2018-08-02 RX ADMIN — SODIUM CHLORIDE 3 G: 9 INJECTION, SOLUTION INTRAVENOUS at 03:02

## 2018-08-02 RX ADMIN — ACETAMINOPHEN 650 MG: 325 TABLET, FILM COATED ORAL at 09:49

## 2018-08-02 RX ADMIN — GABAPENTIN 600 MG: 300 CAPSULE ORAL at 23:23

## 2018-08-02 RX ADMIN — SODIUM CHLORIDE 3 G: 9 INJECTION, SOLUTION INTRAVENOUS at 14:30

## 2018-08-02 RX ADMIN — CHLORPROMAZINE HYDROCHLORIDE 200 MG: 100 TABLET, SUGAR COATED ORAL at 21:25

## 2018-08-02 RX ADMIN — POTASSIUM CHLORIDE 40 MEQ: 1500 TABLET, EXTENDED RELEASE ORAL at 21:25

## 2018-08-02 RX ADMIN — SERTRALINE HYDROCHLORIDE 100 MG: 100 TABLET ORAL at 09:40

## 2018-08-02 RX ADMIN — FOLIC ACID 1 MG: 1 TABLET ORAL at 09:40

## 2018-08-02 RX ADMIN — SODIUM CHLORIDE 3 G: 9 INJECTION, SOLUTION INTRAVENOUS at 09:38

## 2018-08-02 RX ADMIN — BACITRACIN ZINC 1 SMALL APPLICATION: 500 OINTMENT TOPICAL at 09:39

## 2018-08-02 RX ADMIN — Medication 100 MG: at 09:39

## 2018-08-02 RX ADMIN — SODIUM CHLORIDE 3 G: 9 INJECTION, SOLUTION INTRAVENOUS at 21:32

## 2018-08-02 RX ADMIN — GABAPENTIN 600 MG: 300 CAPSULE ORAL at 19:07

## 2018-08-02 RX ADMIN — GABAPENTIN 600 MG: 300 CAPSULE ORAL at 09:39

## 2018-08-02 RX ADMIN — BACITRACIN ZINC 1 SMALL APPLICATION: 500 OINTMENT TOPICAL at 19:07

## 2018-08-02 RX ADMIN — HYDROXYZINE HYDROCHLORIDE 50 MG: 25 TABLET, FILM COATED ORAL at 21:26

## 2018-08-02 RX ADMIN — POTASSIUM CHLORIDE 20 MEQ: 1500 TABLET, EXTENDED RELEASE ORAL at 19:07

## 2018-08-02 NOTE — PROGRESS NOTES
Progress Note - OB/GYN   Jb Tatum 29 y o  female MRN: [id number]  Unit/Bed#: [address] [place] [address] Encounter: [id number]    Assessment/Plan:    If patient consents to u/s and evaluation we will comply  If not she should follow up with termination provider  Subjective/Objective   Chief Complaint: Overdose/pregnant  Subjective: Patient refused service  Says will consider u/s tomorrow      Vitals: Blood pressure (!) 89/52, pulse 93, temperature 99 5 °F (37 5 °C), temperature source Oral, resp  rate 18, height 5' 6" (1 676 m), weight 81 6 kg (179 lb 14 3 oz), SpO2 96 %, not currently breastfeeding  ,Body mass index is 29 04 kg/m²        Intake/Output Summary (Last 24 hours) at 08/02/18 1553  Last data filed at 08/02/18 1301   Gross per 24 hour   Intake                0 ml   Output             1600 ml   Net            -1600 ml       Invasive Devices     Peripheral Intravenous Line            Peripheral IV 08/01/18 Left External Jugular 1 day

## 2018-08-02 NOTE — PROGRESS NOTES
[address] Internal Medicine Progress Note  Patient: Viet Cagle 29 y o  female   MRN: [id number]  PCP: Margret Rosenthal MD  Unit/Bed#: [address] [place] [address] Encounter: [id number]  Date Of Visit: 18    Problem List:    Principal Problem:    Drug overdose  Active Problems:    Leukocytosis    Hypokalemia    Pregnancy    Bipolar disorder ([address] )    Hyponatremia    Alcohol abuse      Assessment & Plan:    * Drug overdose   Assessment & Plan    Patient reported that she was using crack/heroin and passed out  Received Narcan  Patient denies suicidal ideation   Refuses inpatient drug/alcohol rehab  She will follow up with her psychiatrist and therapist at [facility] after the discharge  Leukocytosis   Assessment & Plan    Leukocytosis has trended down although she is on antibiotics for possible aspiration pneumonia  Chest x-ray does not show any obvious pneumonia; however procalcitonin level is elevated and will continue treatment for possible aspiration pneumonia  She was given Zosyn in the ED and is on Unasyn here  urine strep and legionella negative  UA shows possible signs of UTI        Hyponatremia   Assessment & Plan    Resolved with repletion  patient had refused lab work yesterday        Bipolar disorder [address]    Patient also with anxiety, Borderline personality disorder  Patient seen by Psychiatry  Patient aware that her routine psychotropic medications can harm the baby but she stated that she is planning to terminate her pregnancy and wanted all her medications be ordered  Continue Thorazine, Zoloft        Pregnancy   Assessment & Plan    She states that she is contemplating   She had also reported some vaginal spotting  Beta HCG quantitative level was 29,604  Patient refused ultrasound and evaluation by Dr Nithin Marie   She can follow up outpatient for pregnancy termination if she chooses        Hypokalemia   Assessment & Plan    Repleted    Get repeat lab work in the a m  patient had refused lab work yesterday        Alcohol abuse   Assessment & Plan    Continue thiamine/folic acid  No signs of withdrawal        Chest painresolved as of 2018   Assessment & Plan    Resolved  Initial troponin was WNL  VTE Pharmacologic Prophylaxis:   Pharmacologic: Pharmacologic VTE Prophylaxis contraindicated due to Low risk, patient ambulatory  Mechanical VTE Prophylaxis in Place: Yes    Patient Centered Rounds: I have performed bedside rounds with nursing staff today  Discussions with Specialists or Other Care Team Provider: Yes    Education and Discussions with Family / Patient:Yes    Time Spent for Care: 30 minutes  More than 50% of total time spent on counseling and coordination of care as described above  Current Length of Stay: 1 day(s)    Current Patient Status: Inpatient     Discharge Plan:  Home    Code Status: Level 1 - Full Code    Certification Statement: The patient will continue to require additional inpatient hospital stay due to Severe hypokalemia, possible pneumonia      Subjective:   States she feels okay  Reports vomiting    Objective:     Vitals:   Temp (24hrs), Av 6 °F (37 °C), Min:97 6 °F (36 4 °C), Max:99 5 °F (37 5 °C)    HR:  [76-93] 93  Resp:  [18] 18  BP: ()/(52-65) 89/52  SpO2:  [93 %-96 %] 96 %  Body mass index is 29 04 kg/m²  Input and Output Summary (last 24 hours): Intake/Output Summary (Last 24 hours) at 18  Last data filed at 18 1301   Gross per 24 hour   Intake                0 ml   Output             1200 ml   Net            -1200 ml       Physical Exam:     Physical Exam   Constitutional: She is oriented to person, place, and time  She appears well-developed and well-nourished  No distress  HENT:   Head: Normocephalic  Multiple scabs on her face   Eyes: EOM are normal  Right eye exhibits no discharge  Left eye exhibits no discharge  No scleral icterus  Neck: Neck supple  Cardiovascular: Normal rate and regular rhythm  Pulmonary/Chest: Effort normal and breath sounds normal  No respiratory distress  She has no wheezes  She has no rales  Abdominal: Soft  Bowel sounds are normal  She exhibits no distension  There is no tenderness  Musculoskeletal: She exhibits no edema  Neurological: She is alert and oriented to person, place, and time  No cranial nerve deficit  Skin: Skin is dry  She is not diaphoretic  Psychiatric: Her mood appears anxious  Additional Data:     Labs:      Results from last 7 days  Lab Units 18  1358   WBC Thousand/uL 11 70*   HEMOGLOBIN g/dL 11 0*   HEMATOCRIT % 32 4*   PLATELETS Thousands/uL 260   NEUTROS PCT % 69   LYMPHS PCT % 20   MONOS PCT % 11   EOS PCT % 0       Results from last 7 days  Lab Units 18  1358 18  0243   SODIUM mmol/L 137 130*   POTASSIUM mmol/L 2 9* 3 0*   CHLORIDE mmol/L 101 95*   CO2 mmol/L 27 21   BUN mg/dL 3* 16   CREATININE mg/dL 0 64 1 26   CALCIUM mg/dL 8 3 8 9   TOTAL PROTEIN g/dL  --  7 9   BILIRUBIN TOTAL mg/dL  --  0 40   ALK PHOS U/L  --  59   ALT U/L  --  26   AST U/L  --  29   GLUCOSE RANDOM mg/dL 92 189*           * I Have Reviewed All Lab Data Listed Above  * Additional Pertinent Lab Tests Reviewed: All Labs For Current Hospital Admission Reviewed    Imaging:  Xr Chest 1 View Portable    Result Date: 2018  Narrative: CHEST INDICATION:   sob  "Overdose - Accidental (patient admits to smoking cocaine and shooting up "alot of heroin" unsure of how many bags, parents gave 8mg of narcan, is pregnant states is getting an  today, admits to picking at skin)" COMPARISON:  None EXAM PERFORMED/VIEWS:  XR CHEST PORTABLE FINDINGS: No pneumothorax is seen  The lungs appear grossly clear  The cardiac and mediastinal contours appear unremarkable  Visualized bones appear intact  Impression: No acute cardiopulmonary disease   Workstation performed: [id number]     Us Ob Pregnancy Limited With Transvaginal    Result Date: 2018  Narrative: FIRST TRIMESTER OBSTETRIC ULTRASOUND, COMPLETE INDICATION:  Lower abdominal cramping for 5 days  LMP is 2018, beta-hCG measures 389   COMPARISON: None  TECHNIQUE:   Transabdominal ultrasound of the pelvis was performed  Additional transvaginal imaging was then performed to better assess the gestation, myometrial/endometrial architecture and ovarian parenchymal detail  The study includes volumetric sweeps and traditional still imaging technique  FINDINGS: No intrauterine gestation or adnexal mass identified  Differential remains early IUP, spontaneous  and ectopic pregnancy  Correlate with serial quantitative BHCG  There is no significant subchorionic fluid collection  UTERUS/ADNEXA: The uterus and ovaries are within normal limits, right corpus luteum  The cervix remains closed  Trace nonspecific free fluid in the pelvis which could be physiologic for the patient's age  Impression: No intrauterine gestation or adnexal mass identified  Differential remains early IUP, spontaneous  and ectopic pregnancy  Follow-up with serial beta hCG and pelvic/OB ultrasound recommended in 1-2 weeks or less if clinical symptoms worsen  Workstation performed: [id number]     Us Ob Pregnancy Limited With Transvaginal    Result Date: 2018  Narrative: OBSTETRIC ULTRASOUND, LIMITED INDICATION: 31-year-old female found out she was pregnant today but is unsure of her LMP, possibly in May 2018    Abdominal pain    4 para 0,  4  Prior elective abortions COMPARISON: None  TECHNIQUE:   Transabdominal ultrasound of the pelvis was performed  Additional transvaginal imaging was then performed to better assess, myometrial/endometrial architecture and ovarian parenchymal detail  The study includes volumetric sweeps and traditional still imaging technique  FINDINGS: UTERUS: The uterus is anteverted in position, measuring 8 9 x 4 8 x 5 6 cm   Contour and echotexture appear normal  The cervix is closed and within normal limits  ENDOMETRIUM:  Endometrial stripe measures 8 mm  No evidence of intrauterine gestation  OVARIES/ADNEXA: No adnexal mass evident  There is trace pelvic free fluid which appears simple  Right ovary:  2 4 x 4 2 x 2 2 cm  Doppler flow present  There is a round slightly heterogeneous subtly hypoechoic region within the ovary which does demonstrate some peripheral blood flow  I suspect this is probably corpus luteum rather than ectopic pregnancy  Close follow-up is obviously warranted in this clinical scenario  Left ovary:  3 2 x 1 3 x 1 5 cm  Doppler flow present  No suspicious abnormality  Impression: No intrauterine gestation or suspicious adnexal mass identified  Rounded subtle hypoechoic region in the right ovary with peripheral blood flow is probably corpus luteum  Close follow-up suggested  Differential remains early IUP, spontaneous  and ectopic pregnancy  Correlate with serial quantitative BHCG   Workstation performed: [id number]     Imaging Reports Reviewed by myself    Cultures:   Blood Culture: No results found for: BLOODCX  Urine Culture:   Lab Results   Component Value Date    URINECX <10,000 cfu/ml  2018     Sputum Culture: No components found for: SPUTUMCX  Wound Culture: No results found for: WOUNDCULT    Last 24 Hours Medication List:     Current Facility-Administered Medications:  acetaminophen 650 mg Oral Q6H PRN Jamila Hanley DO    ampicillin-sulbactam 3 g Intravenous Q6H Anish Abbasi MD Last Rate: 3 g (18 1430)   bacitracin 1 small application Topical BID Jamila Hanley DO    chlorproMAZINE 200 mg Oral HS Mile Munroe MD    folic acid 1 mg Oral Daily Anish Abbasi MD    gabapentin 600 mg Oral TID Mile Munroe MD    hydrOXYzine HCL 50 mg Oral HS Mile Munroe MD    hydrOXYzine HCL 50 mg Oral BID PRN Mile Munroe MD    ondansetron 4 mg Intravenous Q6H PRN Jamila DO [place]    potassium chloride 40 mEq Oral Once Jennifer Albarran MD    potassium chloride 20 mEq Intravenous Once Jamila Xiaoar, DO    Followed by        potassium chloride 20 mEq Intravenous Once Jamila Xiaoar, DO    sertraline 100 mg Oral Daily Yasmine Kent MD    thiamine 100 mg Oral Daily Jennifer Albarran MD         Today, Patient Was Seen By: Myke Garcia DO    ** Please Note: Dragon 360 Dictation voice to text software may have been used in the creation of this document   **

## 2018-08-02 NOTE — PROGRESS NOTES
Patient examined spoke with the nurse patient remained difficult she has been noncompliance of the treatments nurse reports that she refused ultrasound and she refused to talk to [place] gyn doctor even though patient is requesting to terminate her pregnancy  After the discussion with the nurse I sat down with the patient and explain her that he is doctor can help her with the   Patient reports that I was upset id not want him to look at my face then I explain her that he has job used to help her with   After the discussion she is willing to see him again  Patient is also explained that she has to comply with the blood work and ultrasound because she needs those kind of workup before the procedure  Nurse reports that crisis worker also came to see her but she was sleeping and worker will come back later to talk to her she has some more information regarding  to help the patient  Patient is determined to terminate the pregnancy and she is demanding to continue her psychotropic medications  Patient is bipolar clifton drug addict very impulsive she has a longstanding self-mutilating behavior but she is not suicidal   Medical evaluation and treatment is in progress  I reviewed her psychotropic medications  She insists to continue her medication as ordered  She is asking to give her hydroxyzine and at 4:00 p m  she takes at that time when she is home etc   I will order hydroxyzine and p r n     And continue all her psychotropic medications the same at this time  Therapy done with good response

## 2018-08-02 NOTE — CONSULTS
Consultation - Gynecology  Shankar Torres 29 y o  female MRN: [id number]  Unit/Bed#: [address] [place] [address]- Encounter: [id number]      Consults      Chief Complaint   Patient presents with    Overdose - Accidental     patient admits to smoking cocaine and shooting up "alot of heroin" unsure of how many bags, parents gave 8mg of narcan, is pregnant states is getting an  today, admits to picking at skin       History of Present Illness   Physician Requesting Consult: Jazmin De Leon DO  Reason for Consult / Principal Problem: Pregnant with overdose  Subspeciality: General GYN  HPI: Shankar Torres is a 29y o  year old female who presents s/p overdose with positive pregnancy test, u/s previously inconclusive      Review of Systems: Refused    Historical Information   Past Medical History:   Diagnosis Date    Psychiatric disorder     Bipolar 1 ,borderline personality disorder     History reviewed  No pertinent surgical history  OB History    Para Term  AB Living   5 0     4     SAB TAB Ectopic Multiple Live Births                  # Outcome Date GA Lbr Amol/2nd Weight Sex Delivery Anes PTL Lv   5 Current            4 AB            3 AB            2 AB            1 AB                 History reviewed  No pertinent family history    Social History   History   Alcohol Use    Yes     Comment: occas     History   Drug Use    Types: Heroin, Marijuana, Cocaine     Comment:  OD in May,now clean from heroin,still smokes marijuana few times per week     History   Smoking Status    Current Every Day Smoker    Packs/day: 1 00   Smokeless Tobacco    Never Used       Meds/Allergies   Current Facility-Administered Medications   Medication Dose Route Frequency    acetaminophen (TYLENOL) tablet 650 mg  650 mg Oral Q6H PRN    ampicillin-sulbactam (UNASYN) 3 g in sodium chloride 0 9 % 100 mL IVPB  3 g Intravenous Q6H    bacitracin topical ointment 1 small application  1 small application Topical BID    chlorproMAZINE (THORAZINE) tablet 200 mg  200 mg Oral HS    folic acid (FOLVITE) tablet 1 mg  1 mg Oral Daily    gabapentin (NEURONTIN) capsule 600 mg  600 mg Oral TID    hydrOXYzine HCL (ATARAX) tablet 50 mg  50 mg Oral HS    ondansetron (ZOFRAN) injection 4 mg  4 mg Intravenous Q6H PRN    sertraline (ZOLOFT) tablet 100 mg  100 mg Oral Daily    thiamine (VITAMIN B1) tablet 100 mg  100 mg Oral Daily         No Known Allergies    Objective   Vitals: Blood pressure 125/62, pulse 79, temperature 98 5 °F (36 9 °C), temperature source Oral, resp  rate 18, height 5' 6" (1 676 m), weight 81 6 kg (179 lb 14 3 oz), SpO2 96 %, not currently breastfeeding  Body mass index is 29 04 kg/m²        Intake/Output Summary (Last 24 hours) at 08/01/18 2225  Last data filed at 08/01/18 1601   Gross per 24 hour   Intake             2240 ml   Output              700 ml   Net             1540 ml       Invasive Devices     Peripheral Intravenous Line            Peripheral IV 08/01/18 Left External Jugular less than 1 day                Physical Exam: Refused    Lab Results:   Admission on 08/01/2018   Component Date Value    Sodium 08/01/2018 130*    Potassium 08/01/2018 3 0*    Chloride 08/01/2018 95*    CO2 08/01/2018 21     Anion Gap 08/01/2018 14*    BUN 08/01/2018 16     Creatinine 08/01/2018 1 26     Glucose 08/01/2018 189*    Calcium 08/01/2018 8 9     AST 08/01/2018 29     ALT 08/01/2018 26     Alkaline Phosphatase 08/01/2018 59     Total Protein 08/01/2018 7 9     Albumin 08/01/2018 4 0     Total Bilirubin 08/01/2018 0 40     eGFR 08/01/2018 58     WBC 08/01/2018 35 93*    RBC 08/01/2018 4 02     Hemoglobin 08/01/2018 12 8     Hematocrit 08/01/2018 37 0     MCV 08/01/2018 92     MCH 08/01/2018 31 8     MCHC 08/01/2018 34 6     RDW 08/01/2018 13 3     MPV 08/01/2018 9 0     Platelets 78/67/6391 338     nRBC 08/01/2018 0     Color, UA 08/01/2018 Yellow     Clarity, UA 08/01/2018 Cloudy     Specific Gravity, UA 08/01/2018 >=1 030     pH, UA 08/01/2018 5 0     Leukocytes, UA 08/01/2018 Trace*    Nitrite, UA 08/01/2018 Negative     Protein, UA 08/01/2018 30 (1+)*    Glucose, UA 08/01/2018 >=1000 (1%)*    Ketones, UA 08/01/2018 15 (1+)*    Urobilinogen, UA 08/01/2018 0 2     Bilirubin, UA 08/01/2018 Negative     Blood, UA 08/01/2018 Small*    EXT PREG TEST UR (Ref: N* 08/01/2018 positive     Troponin I 08/01/2018 <0 02     Ethanol Lvl 08/01/2018 <3     Lipase 08/01/2018 72*    RBC, UA 08/01/2018 2-4*    WBC, UA 08/01/2018 10-20*    Epithelial Cells 08/01/2018 Moderate*    Bacteria, UA 08/01/2018 Moderate*    Hyaline Casts, UA 08/01/2018 2-4*    Segmented % 08/01/2018 83*    Bands % 08/01/2018 11*    Lymphocytes % 08/01/2018 2*    Monocytes % 08/01/2018 4     Eosinophils % 08/01/2018 0     Basophils % 08/01/2018 0     Absolute Neutrophils 08/01/2018 33 77*    Lymphocytes Absolute 08/01/2018 0 72     Monocytes Absolute 08/01/2018 1 44*    Eosinophils Absolute 08/01/2018 0 00     Basophils Absolute 08/01/2018 0 00     Total Counted 08/01/2018 100     Toxic Granulation 08/01/2018 Present     RBC Morphology 08/01/2018 Normal     Platelet Estimate 55/30/0094 Adequate     LACTIC ACID 08/01/2018 1 0     Amph/Meth UR 08/01/2018 Negative     Barbiturate Ur 08/01/2018 Negative     Benzodiazepine Urine 08/01/2018 Negative     Cocaine Urine 08/01/2018 Positive*    Methadone Urine 08/01/2018 Negative     Opiate Urine 08/01/2018 Positive*    PCP Ur 08/01/2018 Negative     THC Urine 08/01/2018 Positive*    Magnesium 08/01/2018 2 0     HCG, Quant 08/01/2018 82866*    Strep pneumoniae antigen* 08/01/2018 Negative     Legionella Urinary Antig* 08/01/2018 Negative     Ventricular Rate 08/01/2018 85     Atrial Rate 08/01/2018 85     CT Interval 08/01/2018 150     QRSD Interval 08/01/2018 90     QT Interval 08/01/2018 382     QTC Interval 08/01/2018 454     P Axis 08/01/2018 67     QRS Axis 08/01/2018 67     T Wave Axis 08/01/2018 47          Assessment/Plan     Early IUP in the presence of significant substance abuse  Recommend repeat ultrasound and referal after discharge to [facility] special[facility] in OB substance abuse    Counseling / Coordination of Care  Total floor / unit time spent today20 minutes  minutes  Greater than 50% of total time was spent with the patient and / or family counseling and / or coordination of care  A description of the counseling / coordination of care includes discussion with ordering provider      Marissa Valdovinos MD  8/1/2018  10:25 PM

## 2018-08-03 VITALS
DIASTOLIC BLOOD PRESSURE: 49 MMHG | RESPIRATION RATE: 18 BRPM | HEART RATE: 80 BPM | BODY MASS INDEX: 28.91 KG/M2 | TEMPERATURE: 98.5 F | WEIGHT: 179.9 LBS | OXYGEN SATURATION: 93 % | SYSTOLIC BLOOD PRESSURE: 100 MMHG | HEIGHT: 66 IN

## 2018-08-03 PROBLEM — D72.829 LEUKOCYTOSIS: Status: RESOLVED | Noted: 2018-08-01 | Resolved: 2018-08-03

## 2018-08-03 PROBLEM — E87.1 HYPONATREMIA: Status: RESOLVED | Noted: 2018-08-01 | Resolved: 2018-08-03

## 2018-08-03 PROBLEM — T50.901A DRUG OVERDOSE: Status: RESOLVED | Noted: 2018-08-01 | Resolved: 2018-08-03

## 2018-08-03 LAB
ANION GAP SERPL CALCULATED.3IONS-SCNC: 6 MMOL/L (ref 4–13)
BUN SERPL-MCNC: 3 MG/DL (ref 5–25)
CALCIUM SERPL-MCNC: 7.6 MG/DL (ref 8.3–10.1)
CHLORIDE SERPL-SCNC: 105 MMOL/L (ref 100–108)
CO2 SERPL-SCNC: 28 MMOL/L (ref 21–32)
CREAT SERPL-MCNC: 0.49 MG/DL (ref 0.6–1.3)
ERYTHROCYTE [DISTWIDTH] IN BLOOD BY AUTOMATED COUNT: 13.4 % (ref 11.6–15.1)
GFR SERPL CREATININE-BSD FRML MDRD: 133 ML/MIN/1.73SQ M
GLUCOSE SERPL-MCNC: 87 MG/DL (ref 65–140)
HCT VFR BLD AUTO: 32.2 % (ref 34.8–46.1)
HGB BLD-MCNC: 10.7 G/DL (ref 11.5–15.4)
MAGNESIUM SERPL-MCNC: 1.8 MG/DL (ref 1.6–2.6)
MCH RBC QN AUTO: 31.2 PG (ref 26.8–34.3)
MCHC RBC AUTO-ENTMCNC: 33.2 G/DL (ref 31.4–37.4)
MCV RBC AUTO: 94 FL (ref 82–98)
MRSA NOSE QL CULT: NORMAL
PLATELET # BLD AUTO: 262 THOUSANDS/UL (ref 149–390)
PMV BLD AUTO: 9.1 FL (ref 8.9–12.7)
POTASSIUM SERPL-SCNC: 3 MMOL/L (ref 3.5–5.3)
RBC # BLD AUTO: 3.43 MILLION/UL (ref 3.81–5.12)
SODIUM SERPL-SCNC: 139 MMOL/L (ref 136–145)
WBC # BLD AUTO: 7.64 THOUSAND/UL (ref 4.31–10.16)

## 2018-08-03 PROCEDURE — 99239 HOSP IP/OBS DSCHRG MGMT >30: CPT | Performed by: FAMILY MEDICINE

## 2018-08-03 PROCEDURE — 80048 BASIC METABOLIC PNL TOTAL CA: CPT | Performed by: FAMILY MEDICINE

## 2018-08-03 PROCEDURE — 83735 ASSAY OF MAGNESIUM: CPT | Performed by: FAMILY MEDICINE

## 2018-08-03 PROCEDURE — 85027 COMPLETE CBC AUTOMATED: CPT | Performed by: FAMILY MEDICINE

## 2018-08-03 RX ORDER — POTASSIUM CHLORIDE 14.9 MG/ML
20 INJECTION INTRAVENOUS ONCE
Status: DISCONTINUED | OUTPATIENT
Start: 2018-08-03 | End: 2018-08-03

## 2018-08-03 RX ORDER — POTASSIUM CHLORIDE 20 MEQ/1
20 TABLET, EXTENDED RELEASE ORAL DAILY
Qty: 10 TABLET | Refills: 0 | Status: SHIPPED | OUTPATIENT
Start: 2018-08-03 | End: 2019-03-11 | Stop reason: ALTCHOICE

## 2018-08-03 RX ORDER — FOLIC ACID 1 MG/1
1 TABLET ORAL DAILY
Qty: 30 TABLET | Refills: 0 | Status: SHIPPED | OUTPATIENT
Start: 2018-08-04 | End: 2019-03-11 | Stop reason: ALTCHOICE

## 2018-08-03 RX ORDER — AMOXICILLIN AND CLAVULANATE POTASSIUM 875; 125 MG/1; MG/1
1 TABLET, FILM COATED ORAL 2 TIMES DAILY
Qty: 10 TABLET | Refills: 0 | Status: SHIPPED | OUTPATIENT
Start: 2018-08-03 | End: 2018-08-08

## 2018-08-03 RX ORDER — POTASSIUM CHLORIDE 20 MEQ/1
40 TABLET, EXTENDED RELEASE ORAL ONCE
Status: COMPLETED | OUTPATIENT
Start: 2018-08-03 | End: 2018-08-03

## 2018-08-03 RX ORDER — LANOLIN ALCOHOL/MO/W.PET/CERES
100 CREAM (GRAM) TOPICAL DAILY
Qty: 30 TABLET | Refills: 0 | Status: SHIPPED | OUTPATIENT
Start: 2018-08-04 | End: 2019-03-11 | Stop reason: ALTCHOICE

## 2018-08-03 RX ORDER — POTASSIUM CHLORIDE 29.8 MG/ML
40 INJECTION INTRAVENOUS ONCE
Status: DISCONTINUED | OUTPATIENT
Start: 2018-08-03 | End: 2018-08-03 | Stop reason: SDUPTHER

## 2018-08-03 RX ADMIN — SODIUM CHLORIDE 3 G: 9 INJECTION, SOLUTION INTRAVENOUS at 09:23

## 2018-08-03 RX ADMIN — SODIUM CHLORIDE 3 G: 9 INJECTION, SOLUTION INTRAVENOUS at 15:19

## 2018-08-03 RX ADMIN — BACITRACIN ZINC 1 SMALL APPLICATION: 500 OINTMENT TOPICAL at 09:40

## 2018-08-03 RX ADMIN — Medication 100 MG: at 09:41

## 2018-08-03 RX ADMIN — GABAPENTIN 600 MG: 300 CAPSULE ORAL at 15:21

## 2018-08-03 RX ADMIN — POTASSIUM CHLORIDE 40 MEQ: 1500 TABLET, EXTENDED RELEASE ORAL at 15:19

## 2018-08-03 RX ADMIN — GABAPENTIN 600 MG: 300 CAPSULE ORAL at 09:42

## 2018-08-03 RX ADMIN — SODIUM CHLORIDE 3 G: 9 INJECTION, SOLUTION INTRAVENOUS at 03:31

## 2018-08-03 RX ADMIN — POTASSIUM CHLORIDE 40 MEQ: 1500 TABLET, EXTENDED RELEASE ORAL at 12:11

## 2018-08-03 RX ADMIN — FOLIC ACID 1 MG: 1 TABLET ORAL at 09:40

## 2018-08-03 RX ADMIN — SERTRALINE HYDROCHLORIDE 100 MG: 100 TABLET ORAL at 09:41

## 2018-08-03 NOTE — NURSING NOTE
Pt discharged per MD instructions  Pt IV access removed with catheter intact  Pt tolerated well  Pt verbalized understanding of discharge instructions and follow up appointments  Pt exited [facility] with all belongings and father

## 2018-08-03 NOTE — DISCHARGE INSTRUCTIONS
Stop smoking cigarettes    Stop drinking alcohol and stop using drugs    Take your medications as prescribed

## 2018-08-03 NOTE — PROGRESS NOTES
Patient examined spoke with the nurse reportedly patient is doing better and she agreed with the blood work the patient remained clifton he treated difficult at times but she seems to be doing better and complying more with treatment  Nurse reports that she is also set up for termination of her pregnancy on 2018 as an outpatient after the discharge  Patient reported the same that she will go for  on  and  is helping her  She is happy with ordering extra hydroxyzine p r n  and she is taking her all psychotropic medications as ordered  She will follow up with her psychiatrist at [facility]  Patient is cooperative today and much more mellow she was able to communicates with me and thanking me for ordering her medications and taking care of her  Patient is stable at her baseline with bipolar disorder no shakes no alcohol withdrawal no drug withdrawal noted she offered no new complaints  She is not in distress  Patient denies auditory or visual hallucinations  Patient denies suicidal or homicidal ideations  I will continue her current psychotropic medications as ordered and she will follow up at local mental health clinic [facility] with her psychiatrist and therapist   Discussed with the nurse  I will follow up

## 2018-08-03 NOTE — DISCHARGE SUMMARY
Discharge Summary - [facility]    Patient Information: Amanda Byrnes 29 y o  female MRN: [id number]  Unit/Bed#: [address] [place] [address] Encounter: [id number]    Discharging Physician / Practitioner: Mickey Bales DO  PCP: Silvia John MD  Admission Date: 2018  Discharge Date: 18    Reason for Admission: Overdose - Accidental (patient admits to smoking cocaine and shooting up "alot of heroin" unsure of how many bags, parents gave 8mg of narcan, is pregnant states is getting an  today, admits to picking at skin)      Discharge Diagnoses:     Principal Problem (Resolved):    Drug overdose  Active Problems:    Hypokalemia    Pregnancy    Bipolar disorder ([address] )    Alcohol abuse  Resolved Problems:    Leukocytosis    Hyponatremia    Chest pain        * Drug overdoseresolved as of 8/3/2018   Assessment & Plan    Patient reported that she was using crack/heroin and passed out  She received Narcan  Refused inpatient drug/alcohol rehab  She was seen by Psychiatry here  Patient advised to not use illicit drugs  She will follow up with her psychiatrist and therapist at [facility] after the discharge  Leukocytosisresolved as of 8/3/2018   Assessment & Plan    Leukocytosis resolved  Chest x-ray does not show any obvious pneumonia; however procalcitonin level was elevated and patient did have vomiting several times and was started on Unasyn for possible aspiration pneumonia and discharged on Augmentin  urine strep and legionella negative  UA showed possible signs of UTI        Bipolar disorder [facility])   Assessment & Plan    Patient also with anxiety, Borderline personality disorder  Patient seen by Psychiatry     Patient was made aware by the psychiatrist that her routine psychotropic medications can harm the baby but she stated that she is planning to terminate her pregnancy and wanted all her medications be ordered  Continue home medications        Pregnancy   Assessment & Plan    She stated that she is contemplating   She had also reported some vaginal spotting previously but denied today  Beta HCG quantitative level was 29,604  Patient refused ultrasound and evaluation by Dr Hung Shaffer   She can follow up outpatient for pregnancy termination if she chooses        Hypokalemia   Assessment & Plan    Repleted  Patient refused IV potassium replacement  Daily potassium chloride started on discharge  Repeat BMP as outpatient and follow up with PCP to discuss if potassium supplement can be stopped        Hyponatremiaresolved as of 8/3/2018   Assessment & Plan    Resolved with repletion  Alcohol abuse   Assessment & Plan    Continue thiamine/folic acid  No signs of withdrawal  Patient advised to not drink alcohol        Chest painresolved as of 2018   Assessment & Plan    Resolved  Initial troponin was WNL            Consultations During Hospital Stay:  IP CONSULT TO OB GYN    Procedures Performed:     None    Significant Findings:     See hospital course and above    Imaging while in hospital:    Xr Chest 1 View Portable    Result Date: 2018  Narrative: CHEST INDICATION:   sob  "Overdose - Accidental (patient admits to smoking cocaine and shooting up "alot of heroin" unsure of how many bags, parents gave 8mg of narcan, is pregnant states is getting an  today, admits to picking at skin)" COMPARISON:  None EXAM PERFORMED/VIEWS:  XR CHEST PORTABLE FINDINGS: No pneumothorax is seen  The lungs appear grossly clear  The cardiac and mediastinal contours appear unremarkable  Visualized bones appear intact  Impression: No acute cardiopulmonary disease   Workstation performed: [id number]     Incidental Findings:   · none    Test Results Pending at Discharge (will require follow up):   · As per After Visit Summary     Outpatient Tests Requested:  · BMP    Complications:  See hospital course and above    Hospital Course:     Robert Favre is a 29 y o  female patient who originally presented to the hospital on 8/1/2018 due to drug overdose  She reported that she had not been feeling well for the past several weeks in regards to her mental state  She denied any suicidal ideation  She stated that she smoked crack and then injected heroin causing her to pass out  She was given Narcan and recovered  She then vomited several times  She also reported vaginal spotting  She was admitted and seen by Psychiatry while here  Electrolytes were repleted as needed  She was cleared by all consultants involved in her care prior to discharge home  Her family guidance [facility] manager Williams Estrada was informed as well by     Please see above list of diagnoses and related plan for additional information  Condition at Discharge: stable     Discharge Day Visit / Exam:     Subjective:  Denies any shortness of breath, vomiting, vaginal spotting/bleeding and wants to go home today    Vitals: Blood Pressure: (!) 100/49 (08/03/18 1300)  Pulse: 80 (08/03/18 1300)  Temperature: 98 5 °F (36 9 °C) (08/03/18 1300)  Temp Source: Oral (08/03/18 1300)  Respirations: 18 (08/03/18 1300)  Height: 5' 6" (167 6 cm) (08/01/18 0210)  Weight - Scale: 81 6 kg (179 lb 14 3 oz) (08/01/18 0759)  SpO2: 93 % (08/03/18 1300)  Exam:   Physical Exam   Constitutional: She is oriented to person, place, and time  She appears well-developed and well-nourished  No distress  HENT:   Head: Normocephalic and atraumatic  Multiple scabs noted on the face   Eyes: EOM are normal  Right eye exhibits no discharge  Left eye exhibits no discharge  No scleral icterus  Neck: Neck supple  Cardiovascular: Normal rate and regular rhythm  Pulmonary/Chest: Effort normal and breath sounds normal  No respiratory distress  She has no wheezes  She has no rales  Abdominal: Soft  Bowel sounds are normal  She exhibits no distension  There is no tenderness  Musculoskeletal: She exhibits no edema     Neurological: She is alert and oriented to person, place, and time  No cranial nerve deficit  Skin: Skin is dry  She is not diaphoretic  Psychiatric: Her mood appears anxious  Discharge instructions/Information to patient and family:(Discharge Medications and Follow up):   See after visit summary for information provided to patient and family  Provisions for Follow-Up Care:  See after visit summary for information related to follow-up care and any pertinent home health orders  Disposition: Home    Planned Readmission:  No     Discharge Statement:  I spent > 30 minutes discharging the patient  This time was spent on the day of discharge  I had direct contact with the patient on the day of discharge  Greater than 50% of the total time was spent examining patient, answering all patient questions, arranging and discussing plan of care with patient as well as directly providing post-discharge instructions  Additional time then spent on discharge activities  Discharge Medications:  See after visit summary for reconciled discharge medications provided to patient and family  ** Please Note:  Dictation voice to text software may have been used in the creation of this document   **

## 2018-08-06 ENCOUNTER — TRANSITIONAL CARE MANAGEMENT (OUTPATIENT)
Dept: FAMILY MEDICINE CLINIC | Facility: CLINIC | Age: 28
End: 2018-08-06

## 2018-10-13 ENCOUNTER — HOSPITAL ENCOUNTER (EMERGENCY)
Facility: HOSPITAL | Age: 28
Discharge: ANOTHER HOSPITAL | End: 2018-10-16
Attending: EMERGENCY MEDICINE | Admitting: EMERGENCY MEDICINE
Payer: COMMERCIAL

## 2018-10-13 DIAGNOSIS — F32.A DEPRESSION: Primary | ICD-10-CM

## 2018-10-13 LAB
BILIRUB UR QL STRIP: NEGATIVE
CLARITY UR: CLEAR
COLOR UR: YELLOW
EXT PREG TEST URINE: NEGATIVE
GLUCOSE UR STRIP-MCNC: NEGATIVE MG/DL
HGB UR QL STRIP.AUTO: NEGATIVE
KETONES UR STRIP-MCNC: NEGATIVE MG/DL
LEUKOCYTE ESTERASE UR QL STRIP: ABNORMAL
NITRITE UR QL STRIP: NEGATIVE
PH UR STRIP.AUTO: 6 [PH] (ref 5–9)
PROT UR STRIP-MCNC: NEGATIVE MG/DL
SP GR UR STRIP.AUTO: 1.01 (ref 1–1.03)
UROBILINOGEN UR QL STRIP.AUTO: 0.2 E.U./DL

## 2018-10-13 PROCEDURE — 81001 URINALYSIS AUTO W/SCOPE: CPT | Performed by: EMERGENCY MEDICINE

## 2018-10-13 PROCEDURE — 81025 URINE PREGNANCY TEST: CPT | Performed by: EMERGENCY MEDICINE

## 2018-10-13 PROCEDURE — 80307 DRUG TEST PRSMV CHEM ANLYZR: CPT | Performed by: EMERGENCY MEDICINE

## 2018-10-13 PROCEDURE — 99285 EMERGENCY DEPT VISIT HI MDM: CPT

## 2018-10-14 LAB
ALBUMIN SERPL BCP-MCNC: 3.8 G/DL (ref 3.5–5)
ALP SERPL-CCNC: 55 U/L (ref 46–116)
ALT SERPL W P-5'-P-CCNC: 16 U/L (ref 12–78)
AMPHETAMINES SERPL QL SCN: NEGATIVE
ANION GAP SERPL CALCULATED.3IONS-SCNC: 13 MMOL/L (ref 4–13)
AST SERPL W P-5'-P-CCNC: 16 U/L (ref 5–45)
BACTERIA UR QL AUTO: ABNORMAL /HPF
BARBITURATES UR QL: NEGATIVE
BASOPHILS # BLD AUTO: 0.04 THOUSANDS/ΜL (ref 0–0.1)
BASOPHILS NFR BLD AUTO: 1 % (ref 0–1)
BENZODIAZ UR QL: NEGATIVE
BILIRUB SERPL-MCNC: 0.1 MG/DL (ref 0.2–1)
BUN SERPL-MCNC: 4 MG/DL (ref 5–25)
CALCIUM SERPL-MCNC: 8.8 MG/DL (ref 8.3–10.1)
CHLORIDE SERPL-SCNC: 105 MMOL/L (ref 100–108)
CO2 SERPL-SCNC: 25 MMOL/L (ref 21–32)
COCAINE UR QL: NEGATIVE
CREAT SERPL-MCNC: 0.73 MG/DL (ref 0.6–1.3)
EOSINOPHIL # BLD AUTO: 0.01 THOUSAND/ΜL (ref 0–0.61)
EOSINOPHIL NFR BLD AUTO: 0 % (ref 0–6)
ERYTHROCYTE [DISTWIDTH] IN BLOOD BY AUTOMATED COUNT: 13.3 % (ref 11.6–15.1)
ETHANOL SERPL-MCNC: 106 MG/DL (ref 0–3)
GFR SERPL CREATININE-BSD FRML MDRD: 112 ML/MIN/1.73SQ M
GLUCOSE SERPL-MCNC: 78 MG/DL (ref 65–140)
HCT VFR BLD AUTO: 41.5 % (ref 34.8–46.1)
HGB BLD-MCNC: 13.7 G/DL (ref 11.5–15.4)
IMM GRANULOCYTES # BLD AUTO: 0.03 THOUSAND/UL (ref 0–0.2)
IMM GRANULOCYTES NFR BLD AUTO: 0 % (ref 0–2)
LYMPHOCYTES # BLD AUTO: 2.87 THOUSANDS/ΜL (ref 0.6–4.47)
LYMPHOCYTES NFR BLD AUTO: 39 % (ref 14–44)
MCH RBC QN AUTO: 31.1 PG (ref 26.8–34.3)
MCHC RBC AUTO-ENTMCNC: 33 G/DL (ref 31.4–37.4)
MCV RBC AUTO: 94 FL (ref 82–98)
METHADONE UR QL: NEGATIVE
MONOCYTES # BLD AUTO: 0.44 THOUSAND/ΜL (ref 0.17–1.22)
MONOCYTES NFR BLD AUTO: 6 % (ref 4–12)
MUCOUS THREADS UR QL AUTO: ABNORMAL
NEUTROPHILS # BLD AUTO: 4.06 THOUSANDS/ΜL (ref 1.85–7.62)
NEUTS SEG NFR BLD AUTO: 54 % (ref 43–75)
NON-SQ EPI CELLS URNS QL MICRO: ABNORMAL /HPF
NRBC BLD AUTO-RTO: 0 /100 WBCS
OPIATES UR QL SCN: NEGATIVE
PCP UR QL: NEGATIVE
PLATELET # BLD AUTO: 373 THOUSANDS/UL (ref 149–390)
PMV BLD AUTO: 8.7 FL (ref 8.9–12.7)
POTASSIUM SERPL-SCNC: 4 MMOL/L (ref 3.5–5.3)
PROT SERPL-MCNC: 7.7 G/DL (ref 6.4–8.2)
RBC # BLD AUTO: 4.41 MILLION/UL (ref 3.81–5.12)
RBC #/AREA URNS AUTO: ABNORMAL /HPF
SODIUM SERPL-SCNC: 143 MMOL/L (ref 136–145)
THC UR QL: POSITIVE
WBC # BLD AUTO: 7.45 THOUSAND/UL (ref 4.31–10.16)
WBC #/AREA URNS AUTO: ABNORMAL /HPF

## 2018-10-14 PROCEDURE — 80320 DRUG SCREEN QUANTALCOHOLS: CPT | Performed by: EMERGENCY MEDICINE

## 2018-10-14 PROCEDURE — 85025 COMPLETE CBC W/AUTO DIFF WBC: CPT | Performed by: EMERGENCY MEDICINE

## 2018-10-14 PROCEDURE — 36415 COLL VENOUS BLD VENIPUNCTURE: CPT | Performed by: EMERGENCY MEDICINE

## 2018-10-14 PROCEDURE — 80053 COMPREHEN METABOLIC PANEL: CPT | Performed by: EMERGENCY MEDICINE

## 2018-10-14 RX ORDER — NICOTINE 21 MG/24HR
21 PATCH, TRANSDERMAL 24 HOURS TRANSDERMAL DAILY
Status: DISCONTINUED | OUTPATIENT
Start: 2018-10-14 | End: 2018-10-16 | Stop reason: HOSPADM

## 2018-10-14 RX ORDER — CHLORPROMAZINE HYDROCHLORIDE 100 MG/1
200 TABLET, FILM COATED ORAL
Status: DISCONTINUED | OUTPATIENT
Start: 2018-10-14 | End: 2018-10-16 | Stop reason: HOSPADM

## 2018-10-14 RX ORDER — HYDROXYZINE HYDROCHLORIDE 25 MG/1
50 TABLET, FILM COATED ORAL
Status: DISCONTINUED | OUTPATIENT
Start: 2018-10-14 | End: 2018-10-14

## 2018-10-14 RX ORDER — HYDROXYZINE HYDROCHLORIDE 25 MG/1
50 TABLET, FILM COATED ORAL 3 TIMES DAILY PRN
Status: DISCONTINUED | OUTPATIENT
Start: 2018-10-14 | End: 2018-10-16 | Stop reason: HOSPADM

## 2018-10-14 RX ORDER — TRAZODONE HYDROCHLORIDE 50 MG/1
200 TABLET ORAL
Status: DISCONTINUED | OUTPATIENT
Start: 2018-10-14 | End: 2018-10-16 | Stop reason: HOSPADM

## 2018-10-14 RX ADMIN — SERTRALINE HYDROCHLORIDE 100 MG: 50 TABLET ORAL at 18:19

## 2018-10-14 RX ADMIN — GABAPENTIN 800 MG: 100 CAPSULE ORAL at 21:54

## 2018-10-14 RX ADMIN — TRAZODONE HYDROCHLORIDE 200 MG: 50 TABLET ORAL at 00:14

## 2018-10-14 RX ADMIN — CHLORPROMAZINE HYDROCHLORIDE 200 MG: 100 TABLET, SUGAR COATED ORAL at 21:54

## 2018-10-14 RX ADMIN — GABAPENTIN 800 MG: 100 CAPSULE ORAL at 00:14

## 2018-10-14 RX ADMIN — GABAPENTIN 800 MG: 100 CAPSULE ORAL at 18:19

## 2018-10-14 RX ADMIN — HYDROXYZINE HYDROCHLORIDE 50 MG: 25 TABLET ORAL at 18:19

## 2018-10-14 RX ADMIN — TRAZODONE HYDROCHLORIDE 200 MG: 50 TABLET ORAL at 21:54

## 2018-10-14 RX ADMIN — NICOTINE 21 MG: 21 PATCH, EXTENDED RELEASE TRANSDERMAL at 18:20

## 2018-10-14 RX ADMIN — CHLORPROMAZINE HYDROCHLORIDE 200 MG: 100 TABLET, SUGAR COATED ORAL at 00:41

## 2018-10-14 RX ADMIN — HYDROXYZINE HYDROCHLORIDE 50 MG: 25 TABLET ORAL at 00:14

## 2018-10-14 NOTE — ED NOTES
Pt changed into hospital attire, belongings removed from room and placed in locker #23  Pt cooperative at this time   On constant observation     Frederick Aquino RN  10/14/18 9740

## 2018-10-14 NOTE — ED NOTES
1000 PES assessed patient and she denied any SI/HI or plans  She admitted to threatening to kill boyfriend last night but said it was because she was drunk and they got into a fight and she did not mean it  She denied any extensive MH history and said she was hospitalized once last year  She refused inpatient treatment stating she has outpt and does not need more than that  She did not want me to contact boyfriend so I called her dad and left him a message requesting a call back  200 Pts dad called me back and reported pt had a hospitalization last year for like 11 months at a [facility] facility and has had numerous drug overdoses, yet patient denied use to me  He told me she frequently threatens to kill herself  [facility] was called for a screening and they will be by shortly to see her

## 2018-10-14 NOTE — ED PROVIDER NOTES
History  Chief Complaint   Patient presents with    Psychiatric Evaluation     pt states got in a fight with boyfriend tonight, made statement that she wanted to kill her boyfriend, states it was a misunderstanding and she shouldn't have said it  Pt requesting her HS meds, admits to drinking alcohol today     Pt in ER with police for psychiatric eval  Pt states that she was leaving her boyfriend, and he called the police on her because he didn't want her to leave  Pt has warrants on her record and when the police arrived, she admits that she said that she would kill her boyfriend  She denies SI  She denies somatic complaints  Pt admits to alcohol use today  Prior to Admission Medications   Prescriptions Last Dose Informant Patient Reported? Taking?   chlorproMAZINE (THORAZINE) 200 mg tablet   Yes Yes   Sig: Take 200 mg by mouth Medrol Dose Pack scheduling ONLY   folic acid (FOLVITE) 1 mg tablet   No No   Sig: Take 1 tablet (1 mg total) by mouth daily   gabapentin (NEURONTIN) 300 mg capsule  Self Yes Yes   Sig: Take 600 mg by mouth 3 (three) times a day     hydrOXYzine pamoate (VISTARIL) 50 mg capsule  Self Yes Yes   Sig: Take 50 mg by mouth every evening   potassium chloride (K-DUR,KLOR-CON) 20 mEq tablet   No No   Sig: Take 1 tablet (20 mEq total) by mouth daily   sertraline (ZOLOFT) 100 mg tablet   Yes Yes   Sig: Take 100 mg by mouth daily   thiamine 100 MG tablet   No No   Sig: Take 1 tablet (100 mg total) by mouth daily   traZODone (DESYREL) 100 mg tablet   Yes Yes   Sig: Take 200 mg by mouth daily at bedtime      Facility-Administered Medications: None       Past Medical History:   Diagnosis Date    Psychiatric disorder     Bipolar 1 ,borderline personality disorder       History reviewed  No pertinent surgical history  History reviewed  No pertinent family history  I have reviewed and agree with the history as documented      Social History   Substance Use Topics    Smoking status: Current Every Day Smoker     Packs/day: 1 00    Smokeless tobacco: Never Used    Alcohol use Yes      Comment: occas        Review of Systems   Constitutional: Negative for chills and fever  Psychiatric/Behavioral: Positive for behavioral problems  Negative for self-injury and suicidal ideas  All other systems reviewed and are negative  Physical Exam  Physical Exam   Constitutional: She appears well-developed and well-nourished  No distress  HENT:   Head: Normocephalic and atraumatic  Eyes: Pupils are equal, round, and reactive to light  Conjunctivae are normal    Neck: Normal range of motion  Neck supple  Cardiovascular: Normal rate, regular rhythm and normal heart sounds  No murmur heard  Pulmonary/Chest: Effort normal and breath sounds normal  No respiratory distress  Abdominal: Soft  Bowel sounds are normal  She exhibits no distension  There is no tenderness  Musculoskeletal: Normal range of motion  She exhibits no edema or deformity  Neurological: She is alert  No cranial nerve deficit  Skin: Skin is warm and dry  No rash noted  She is not diaphoretic  No pallor  Psychiatric: Her behavior is normal  Her affect is blunt  Nursing note and vitals reviewed        Vital Signs  ED Triage Vitals   Temperature Pulse Respirations Blood Pressure SpO2   10/13/18 2339 10/13/18 2339 10/13/18 2339 10/13/18 2339 10/13/18 2339   (!) 97 °F (36 1 °C) 81 18 97/73 96 %      Temp Source Heart Rate Source Patient Position - Orthostatic VS BP Location FiO2 (%)   10/13/18 2339 10/13/18 2339 10/14/18 1240 10/13/18 2339 --   Probe Monitor Lying Right arm       Pain Score       10/15/18 2146       9           Vitals:    10/13/18 2339 10/14/18 1240 10/15/18 2146 10/16/18 1030   BP: 97/73 115/56 122/68 123/80   Pulse: 81 81 80 103   Patient Position - Orthostatic VS:  Lying Sitting Sitting       Visual Acuity      ED Medications  Medications   gabapentin (NEURONTIN) capsule 800 mg (800 mg Oral Given 10/14/18 1819) acetaminophen (TYLENOL) tablet 650 mg (650 mg Oral Given 10/15/18 2210)       Diagnostic Studies  Results Reviewed     Procedure Component Value Units Date/Time    Comprehensive metabolic panel [[id number]]  (Abnormal) Collected:  10/14/18 0002    Lab Status:  Final result Specimen:  Blood from Hand, Right Updated:  10/14/18 0022     Sodium 143 mmol/L      Potassium 4 0 mmol/L      Chloride 105 mmol/L      CO2 25 mmol/L      ANION GAP 13 mmol/L      BUN 4 (L) mg/dL      Creatinine 0 73 mg/dL      Glucose 78 mg/dL      Calcium 8 8 mg/dL      AST 16 U/L      ALT 16 U/L      Alkaline Phosphatase 55 U/L      Total Protein 7 7 g/dL      Albumin 3 8 g/dL      Total Bilirubin 0 10 (L) mg/dL      eGFR 112 ml/min/1 73sq m     Narrative:         National Kidney Disease Education Program recommendations are as follows:  GFR calculation is accurate only with a steady state creatinine  Chronic Kidney disease less than 60 ml/min/1 73 sq  meters  Kidney failure less than 15 ml/min/1 73 sq  meters  Ethanol [[id number]]  (Abnormal) Collected:  10/14/18 0002    Lab Status:  Final result Specimen:  Blood from Arm, Right Updated:  10/14/18 0020     Ethanol Lvl 106 (H) mg/dL     Rapid drug screen, urine [[id number]]  (Abnormal) Collected:  10/13/18 2347    Lab Status:  Final result Specimen:  Urine from Urine, Clean Catch Updated:  10/14/18 0009     Amph/Meth UR Negative     Barbiturate Ur Negative     Benzodiazepine Urine Negative     Cocaine Urine Negative     Methadone Urine Negative     Opiate Urine Negative     PCP Ur Negative     THC Urine Positive (A)    Narrative:         Presumptive report  If requested, specimen will be sent to reference lab for confirmation  FOR MEDICAL PURPOSES ONLY  IF CONFIRMATION NEEDED PLEASE CONTACT THE LAB WITHIN 5 DAYS      Drug Screen Cutoff Levels:  AMPHETAMINE/METHAMPHETAMINES  1000 ng/mL  BARBITURATES     200 ng/mL  BENZODIAZEPINES     200 ng/mL  COCAINE      300 ng/mL  METHADONE      300 ng/mL  OPIATES      300 ng/mL  PHENCYCLIDINE     25 ng/mL  THC       50 ng/mL    CBC and differential [[id number]]  (Abnormal) Collected:  10/14/18 0002    Lab Status:  Final result Specimen:  Blood from Hand, Right Updated:  10/14/18 0007     WBC 7 45 Thousand/uL      RBC 4 41 Million/uL      Hemoglobin 13 7 g/dL      Hematocrit 41 5 %      MCV 94 fL      MCH 31 1 pg      MCHC 33 0 g/dL      RDW 13 3 %      MPV 8 7 (L) fL      Platelets 467 Thousands/uL      nRBC 0 /100 WBCs      Neutrophils Relative 54 %      Immat GRANS % 0 %      Lymphocytes Relative 39 %      Monocytes Relative 6 %      Eosinophils Relative 0 %      Basophils Relative 1 %      Neutrophils Absolute 4 06 Thousands/µL      Immature Grans Absolute 0 03 Thousand/uL      Lymphocytes Absolute 2 87 Thousands/µL      Monocytes Absolute 0 44 Thousand/µL      Eosinophils Absolute 0 01 Thousand/µL      Basophils Absolute 0 04 Thousands/µL     Urine Microscopic [[id number]]  (Abnormal) Collected:  10/13/18 2347    Lab Status:  Final result Specimen:  Urine from Urine, Clean Catch Updated:  10/14/18 0003     RBC, UA None Seen /hpf      WBC, UA 0-1 (A) /hpf      Epithelial Cells Innumerable (A) /hpf      Bacteria, UA Moderate (A) /hpf      MUCOUS THREADS Moderate (A)    UA (URINE) with reflex to Microscopic [[id number]]  (Abnormal) Collected:  10/13/18 2347    Lab Status:  Final result Specimen:  Urine from Urine, Clean Catch Updated:  10/13/18 2355     Color, UA Yellow     Clarity, UA Clear     Specific Gravity, UA 1 015     pH, UA 6 0     Leukocytes, UA Trace (A)     Nitrite, UA Negative     Protein, UA Negative mg/dl      Glucose, UA Negative mg/dl      Ketones, UA Negative mg/dl      Urobilinogen, UA 0 2 E U /dl      Bilirubin, UA Negative     Blood, UA Negative    POCT pregnancy, urine [[id number]]  (Normal) Resulted:  10/13/18 2353    Lab Status:  Final result Updated:  10/13/18 2354     EXT PREG TEST UR (Ref: Negative) negative                 XR chest 1 view portable   Final Result by Jaycee Lazaro MD (10/16 9498)      No acute cardiopulmonary disease              Workstation performed: [id number]                    Procedures  Procedures       Phone Contacts  ED Phone Contact    ED Course                               MDM  Number of Diagnoses or Management Options  Diagnosis management comments: Pt is medically cleared for crisis eval      CritCare Time    Disposition  Final diagnoses:   Depression     Time reflects when diagnosis was documented in both MDM as applicable and the Disposition within this note     Time User Action Codes Description Comment    10/16/2018  5:00 PM TeoLisa Add [F32 9] Depression       ED Disposition     ED Disposition Condition Comment    Transfer to Another [employer] should be transferred out to Kaila bauman MD Documentation      Most Recent Value   Patient Condition  The patient has been stabilized such that within reasonable medical probability, no material deterioration of the patient condition or the condition of the unborn child(alberto) is likely to result from the transfer   Reason for Transfer  Level of Care needed not available at this facility   Benefits of Transfer  Specialized equipment and/or services available at the receiving facility (Include comment)________________________   Risks of Transfer  Potential for delay in receiving treatment   Accepting Physician  Dr Rafael Parsons Name, Juany Leon      RN Documentation      [facility] Name, [address] [place] [address] Dr bauman      Follow-up Information    None         Discharge Medication List as of 10/16/2018  6:30 PM      CONTINUE these medications which have NOT CHANGED    Details   chlorproMAZINE (THORAZINE) 200 mg tablet Take 200 mg by mouth Medrol Dose Pack scheduling ONLY, Historical Med      gabapentin (NEURONTIN) 300 mg capsule Take 600 mg by mouth 3 (three) times a day  , Historical Med      hydrOXYzine pamoate (VISTARIL) 50 mg capsule Take 50 mg by mouth every evening, Historical Med      sertraline (ZOLOFT) 100 mg tablet Take 100 mg by mouth daily, Historical Med      traZODone (DESYREL) 100 mg tablet Take 200 mg by mouth daily at bedtime, Historical Med      folic acid (FOLVITE) 1 mg tablet Take 1 tablet (1 mg total) by mouth daily, Starting Sat 8/4/2018, Print      potassium chloride (K-DUR,KLOR-CON) 20 mEq tablet Take 1 tablet (20 mEq total) by mouth daily, Starting Fri 8/3/2018, Print      thiamine 100 MG tablet Take 1 tablet (100 mg total) by mouth daily, Starting Sat 8/4/2018, Print           No discharge procedures on file      ED Provider  Electronically Signed by           Lisbet Tabares DO  10/17/18 6101

## 2018-10-14 NOTE — ED NOTES
Pt observed lying in bed with eyes closed, remains on constant observation     Alex Bradford RN  10/14/18 1913

## 2018-10-15 RX ORDER — ACETAMINOPHEN 325 MG/1
650 TABLET ORAL ONCE
Status: COMPLETED | OUTPATIENT
Start: 2018-10-15 | End: 2018-10-15

## 2018-10-15 RX ADMIN — HYDROXYZINE HYDROCHLORIDE 50 MG: 25 TABLET ORAL at 12:48

## 2018-10-15 RX ADMIN — TRAZODONE HYDROCHLORIDE 200 MG: 50 TABLET ORAL at 22:12

## 2018-10-15 RX ADMIN — CHLORPROMAZINE HYDROCHLORIDE 200 MG: 100 TABLET, SUGAR COATED ORAL at 22:11

## 2018-10-15 RX ADMIN — ACETAMINOPHEN 650 MG: 325 TABLET, FILM COATED ORAL at 22:10

## 2018-10-15 RX ADMIN — NICOTINE 21 MG: 21 PATCH, EXTENDED RELEASE TRANSDERMAL at 09:57

## 2018-10-15 RX ADMIN — SERTRALINE HYDROCHLORIDE 100 MG: 50 TABLET ORAL at 09:57

## 2018-10-15 RX ADMIN — GABAPENTIN 800 MG: 100 CAPSULE ORAL at 17:42

## 2018-10-15 RX ADMIN — GABAPENTIN 800 MG: 100 CAPSULE ORAL at 09:58

## 2018-10-15 NOTE — ED NOTES
Pt ate 75% of supper  Calm and cooperative    Remains on continuous observation     Bekah Lamas RN  10/15/18 1939

## 2018-10-15 NOTE — ED NOTES
Pt resting quietly in bed  Appears to be sleeping    Remains on continuous observation     Kalpesh Wyatt RN  10/15/18 0474

## 2018-10-15 NOTE — ED NOTES
10/15/18 @ 0815:  PES received call from family guidance center reporting that patient is still awaiting telepsych assessment  VBbrandie, [address]: Robert Galvez arrived and is in the process of setting up telepsych assessment  Arjun, [address]: Telepsych completed and patient committed  Bed search will commence  [address], [address]: Received call from [place] at [facility] inquiring about her client  [address], [address]  1050: PES returned call to [place] at [facility] ([phone]); [facility] [place], but confirmed patient was in ED  [address], [address]: Received update from Robert Galvez at family guidance center:  Patient on "wait list" at [facility], and also referred to [facility]    [address] [place]

## 2018-10-15 NOTE — ED NOTES
Pt observed sleeping, remains on constant observation, no update on telepsych time     Eli Mendez RN  10/15/18 6562

## 2018-10-15 NOTE — ED NOTES
Pt continues to sleep, repositions self in bed, remains on constant observation     Lore Harp, RN  10/15/18 7756

## 2018-10-15 NOTE — ED NOTES
Pt calm and cooperative  Lunch order called in  Pt socializing with peer    Remains on continuous observation     Boris Conner RN  10/15/18 2194

## 2018-10-15 NOTE — ED NOTES
Pt awake, out of bed to bathroom, aware she is waiting for telepsych and that they may not be available until the middle of the night  Pt given HS meds and water   Calm and cooperative, remains on constant observation     Alyce Craig RN  10/14/18 9291

## 2018-10-15 NOTE — ED NOTES
Called [address] Mei / Shefali Dhillon @ 19:00 - pt was declined at [facility], no bed at [facility]; they will con't to be bed searches

## 2018-10-16 ENCOUNTER — APPOINTMENT (EMERGENCY)
Dept: RADIOLOGY | Facility: HOSPITAL | Age: 28
End: 2018-10-16
Payer: COMMERCIAL

## 2018-10-16 VITALS
WEIGHT: 179.9 LBS | SYSTOLIC BLOOD PRESSURE: 123 MMHG | OXYGEN SATURATION: 98 % | HEART RATE: 103 BPM | BODY MASS INDEX: 29.04 KG/M2 | DIASTOLIC BLOOD PRESSURE: 80 MMHG | TEMPERATURE: 98 F | RESPIRATION RATE: 18 BRPM

## 2018-10-16 PROCEDURE — 71045 X-RAY EXAM CHEST 1 VIEW: CPT

## 2018-10-16 PROCEDURE — 93005 ELECTROCARDIOGRAM TRACING: CPT

## 2018-10-16 RX ADMIN — NICOTINE 21 MG: 21 PATCH, EXTENDED RELEASE TRANSDERMAL at 09:28

## 2018-10-16 RX ADMIN — SERTRALINE HYDROCHLORIDE 100 MG: 50 TABLET ORAL at 09:28

## 2018-10-16 RX ADMIN — GABAPENTIN 800 MG: 100 CAPSULE ORAL at 17:07

## 2018-10-16 RX ADMIN — GABAPENTIN 800 MG: 100 CAPSULE ORAL at 09:28

## 2018-10-16 NOTE — ED NOTES
Pt sleeping soundly  Respirations easy and unlabored    Remains on continuous observation     Magaly Morgan RN  10/16/18 0864

## 2018-10-16 NOTE — ED NOTES
Called Arleth Mitchell / Chelsy Arboleda for an update about 16:00 - pt will be accepted to [facility], Arleth Mitchell will call back with accepting physician and phone # for calling report  Dr Porsha Bales accepted to room 407-1; Rn report to be called to [phone] (after 17:00); [facility] to  @ 17:30; face sheet faxed to them  Pt; ER staff and [facility] all updated

## 2018-10-16 NOTE — ED NOTES
Pt showered with this tech supervision  Requested to use phone, provided  Pt sitting in common area talking on phone             Gisele Claude Resch  10/16/18 8662

## 2018-10-16 NOTE — ED NOTES
Pt in room talking with visitors  Calm and cooperative    Remains on continuous observation     Karen Sebastian RN  10/16/18 5094

## 2018-10-16 NOTE — EMTALA/ACUTE CARE TRANSFER
[address] [facility] EMERGENCY DEPARTMENT  [address] [facility] [address]  Dept: [phone]      EMTALA TRANSFER CONSENT    NAME Mile Guevara                                         1990                              MRN [id number]    I have been informed of my rights regarding examination, treatment, and transfer   by Dr Powell att  providers found    Benefits: Specialized equipment and/or services available at the receiving facility (Include comment)________________________    Risks: Potential for delay in receiving treatment      Consent for Transfer:  I acknowledge that my medical condition has been evaluated and explained to me by the emergency department physician or other qualified medical person and/or my attending physician, who has recommended that I be transferred to the service of  Accepting Physician: Dr Francesca Olivarez at [address] [place] [address] Name, [address] : [place]  The above potential benefits of such transfer, the potential risks associated with such transfer, and the probable risks of not being transferred have been explained to me, and I fully understand them  The doctor has explained that, in my case, the benefits of transfer outweigh the risks  I agree to be transferred  I authorize the performance of emergency medical procedures and treatments upon me in both transit and upon arrival at the receiving facility  Additionally, I authorize the release of any and all medical records to the receiving facility and request they be transported with me, if possible  I understand that the safest mode of transportation during a medical emergency is an ambulance and that the Hospital advocates the use of this mode of transport  Risks of traveling to the receiving facility by car, including absence of medical control, life sustaining equipment, such as oxygen, and medical personnel has been explained to me and I fully understand them      (LORAINE CORRECT BOX BELOW)  [  ]  I consent to the stated transfer and to be transported by ambulance/helicopter  [  ]  I consent to the stated transfer, but refuse transportation by ambulance and accept full responsibility for my transportation by car  I understand the risks of non-ambulance transfers and I exonerate the Hospital and its staff from any deterioration in my condition that results from this refusal     X___________________________________________    DATE  10/16/18  TIME________  Signature of patient or legally responsible individual signing on patient behalf           RELATIONSHIP TO PATIENT_________________________          Provider Certification    NAME Sofia Horne                                         1990                              MRN [id number]    A medical screening exam was performed on the above named patient  Based on the examination:    Condition Necessitating Transfer The encounter diagnosis was Depression  Patient Condition: The patient has been stabilized such that within reasonable medical probability, no material deterioration of the patient condition or the condition of the unborn child(alberto) is likely to result from the transfer    Reason for Transfer: Level of Care needed not available at this facility    Transfer Requirements: [address]   · Space available and qualified personnel available for treatment as acknowledged by    · Agreed to accept transfer and to provide appropriate medical treatment as acknowledged by       Dr Micheal Watts  · Appropriate medical records of the examination and treatment of the patient are provided at the time of transfer   [address], [address] _______  · Transfer will be performed by qualified personnel from    and appropriate transfer equipment as required, including the use of necessary and appropriate life support measures      Provider Certification: I have examined the patient and explained the following risks and benefits of being transferred/refusing transfer to the patient/family:         Based on these reasonable risks and benefits to the patient and/or the unborn child(alberto), and based upon the information available at the time of the patients examination, I certify that the medical benefits reasonably to be expected from the provision of appropriate medical treatments at another medical facility outweigh the increasing risks, if any, to the individuals medical condition, and in the case of labor to the unborn child, from effecting the transfer      X____________________________________________ DATE 10/16/18        TIME_______      ORIGINAL - SEND TO MEDICAL RECORDS   COPY - SEND WITH PATIENT DURING TRANSFER

## 2018-10-16 NOTE — ED NOTES
10/19/18 @ 0715:  [facility]; patient declined at [facility]; patient requested [facility], which Jose Raul Mei will attempt, but currently, no beds available  Ari Ng [facility] [address]: Patient's father left message requesting update; PES will check with patient for permission to speak to father  [place], [place]  1140: Patient gave permission to speak to father, and patient was updated with referral to [facility] and [facility]  Ethan Ng 77: PES faxed EKG and CXR to [facility]  Patient was visited by staff from [facility] program at [facility]    Ofelia Liang MS

## 2018-10-16 NOTE — ED NOTES
ES-ICMS, [facility] called ([phone]) - provided her with transfer information and phone #'s  Pt's [facility] therapist, Kaya, called - also given information about pt's transfer  Pt transferred @ 18:00 without incident; [facility] notified

## 2018-10-16 NOTE — ED NOTES
Pt continues sleeping soundly through the night    Remains on continuous watch     Raquel Marroquin RN  10/16/18 7060

## 2018-10-16 NOTE — ED NOTES
Pt cooperative with am meds  Reports feeling tired today and would like to rest a bit longer before showering  Remains on continuous observatiion       Panchito Ny RN  10/16/18 6558

## 2018-10-16 NOTE — EMTALA/ACUTE CARE TRANSFER
[facility] EMERGENCY DEPARTMENT  [address] [facility] [address]  Dept: [phone]      EMTALA TRANSFER CONSENT    NAME Mera Vann                                         1990                              MRN [id number]    I have been informed of my rights regarding examination, treatment, and transfer   by Dr Powell att  providers found    Benefits: Specialized equipment and/or services available at the receiving facility (Include comment)________________________    Risks: Potential for delay in receiving treatment      Transfer Request   I acknowledge that my medical condition has been evaluated and explained to me by the emergency department physician or other qualified medical person and/or my attending physician who has recommended and offered to me further medical examination and treatment  I understand the Hospital's obligation with respect to the treatment and stabilization of my emergency medical condition  I nevertheless request to be transferred  I release the Hospital, the doctor, and any other persons caring for me from all responsibility or liability for any injury or ill effects that may result from my transfer and agree to accept all responsibility for the consequences of my choice to transfer, rather than receive stabilizing treatment at the Hospital  I understand that because the transfer is my request, my insurance may not provide reimbursement for the services  The Hospital will assist and direct me and my family in how to make arrangements for transfer, but the hospital is not liable for any fees charged by the transport service  In spite of this understanding, I refuse to consent to further medical examination and treatment which has been offered to me, and request transfer to [address] Name, [address] : [place]   I authorize the performance of emergency medical procedures and treatments upon me in both transit and upon arrival at the receiving facility  Additionally, I authorize the release of any and all medical records to the receiving facility and request they be transported with me, if possible  I authorize the performance of emergency medical procedures and treatments upon me in both transit and upon arrival at the receiving facility  Additionally, I authorize the release of any and all medical records to the receiving facility and request they be transported with me, if possible  I understand that the safest mode of transportation during a medical emergency is an ambulance and that the Hospital advocates the use of this mode of transport  Risks of traveling to the receiving facility by car, including absence of medical control, life sustaining equipment, such as oxygen, and medical personnel has been explained to me and I fully understand them  (LORAINE CORRECT BOX BELOW)  [x  ]  I consent to the stated transfer and to be transported by ambulance/helicopter  [  ]  I consent to the stated transfer, but refuse transportation by ambulance and accept full responsibility for my transportation by car  I understand the risks of non-ambulance transfers and I exonerate the Hospital and its staff from any deterioration in my condition that results from this refusal     X___________________________________________    DATE  10/16/18  TIME________  Signature of patient or legally responsible individual signing on patient behalf           RELATIONSHIP TO PATIENT_________________________          Provider Certification    NAME Rafy Negron                                        [facility] 1990                              MRN [id number]    A medical screening exam was performed on the above named patient  Based on the examination:    Condition Necessitating Transfer The encounter diagnosis was Depression      Patient Condition: The patient has been stabilized such that within reasonable medical probability, no material deterioration of the patient condition or the condition of the unborn child(alberto) is likely to result from the transfer    Reason for Transfer: Level of Care needed not available at this facility    Transfer Requirements: [address]   · Space available and qualified personnel available for treatment as acknowledged by    · Agreed to accept transfer and to provide appropriate medical treatment as acknowledged by       Dr Glen Ragsdale  · Appropriate medical records of the examination and treatment of the patient are provided at the time of transfer   [address] [facility], [address] _______  · Transfer will be performed by qualified personnel from    and appropriate transfer equipment as required, including the use of necessary and appropriate life support measures  Provider Certification: I have examined the patient and explained the following risks and benefits of being transferred/refusing transfer to the patient/family:         Based on these reasonable risks and benefits to the patient and/or the unborn child(alberto), and based upon the information available at the time of the patients examination, I certify that the medical benefits reasonably to be expected from the provision of appropriate medical treatments at another medical facility outweigh the increasing risks, if any, to the individuals medical condition, and in the case of labor to the unborn child, from effecting the transfer      X____________________________________________ DATE 10/16/18        TIME_______      ORIGINAL - SEND TO MEDICAL RECORDS   COPY - SEND WITH PATIENT DURING TRANSFER

## 2018-10-17 LAB
ATRIAL RATE: 77 BPM
P AXIS: 52 DEGREES
PR INTERVAL: 160 MS
QRS AXIS: 67 DEGREES
QRSD INTERVAL: 82 MS
QT INTERVAL: 394 MS
QTC INTERVAL: 445 MS
T WAVE AXIS: 45 DEGREES
VENTRICULAR RATE: 77 BPM

## 2018-10-17 PROCEDURE — 93010 ELECTROCARDIOGRAM REPORT: CPT | Performed by: INTERNAL MEDICINE

## 2019-01-05 ENCOUNTER — HOSPITAL ENCOUNTER (EMERGENCY)
Dept: HOSPITAL 31 - C.ER | Age: 29
Discharge: HOME | End: 2019-01-05
Payer: COMMERCIAL

## 2019-01-05 VITALS
HEART RATE: 89 BPM | SYSTOLIC BLOOD PRESSURE: 111 MMHG | DIASTOLIC BLOOD PRESSURE: 75 MMHG | RESPIRATION RATE: 16 BRPM | OXYGEN SATURATION: 99 % | TEMPERATURE: 97.7 F

## 2019-01-05 DIAGNOSIS — F19.10: ICD-10-CM

## 2019-01-05 DIAGNOSIS — T76.21XA: Primary | ICD-10-CM

## 2019-01-05 PROCEDURE — 86706 HEP B SURFACE ANTIBODY: CPT

## 2019-01-05 PROCEDURE — 86703 HIV-1/HIV-2 1 RESULT ANTBDY: CPT

## 2019-01-05 PROCEDURE — 96372 THER/PROPH/DIAG INJ SC/IM: CPT

## 2019-01-05 PROCEDURE — 86592 SYPHILIS TEST NON-TREP QUAL: CPT

## 2019-01-05 PROCEDURE — 99285 EMERGENCY DEPT VISIT HI MDM: CPT
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
## 2019-03-11 ENCOUNTER — HOSPITAL ENCOUNTER (EMERGENCY)
Facility: HOSPITAL | Age: 29
Discharge: HOME | End: 2019-03-11
Attending: EMERGENCY MEDICINE | Admitting: EMERGENCY MEDICINE
Payer: COMMERCIAL

## 2019-03-11 ENCOUNTER — HOSPITAL ENCOUNTER (EMERGENCY)
Facility: HOSPITAL | Age: 29
End: 2019-03-13
Attending: EMERGENCY MEDICINE
Payer: COMMERCIAL

## 2019-03-11 VITALS
RESPIRATION RATE: 20 BRPM | OXYGEN SATURATION: 99 % | SYSTOLIC BLOOD PRESSURE: 138 MMHG | HEART RATE: 96 BPM | HEIGHT: 66 IN | TEMPERATURE: 98.6 F | BODY MASS INDEX: 25.71 KG/M2 | DIASTOLIC BLOOD PRESSURE: 90 MMHG | WEIGHT: 160 LBS

## 2019-03-11 DIAGNOSIS — F19.10 POLYSUBSTANCE ABUSE (HCC): ICD-10-CM

## 2019-03-11 DIAGNOSIS — T50.901A OVERDOSE: ICD-10-CM

## 2019-03-11 DIAGNOSIS — F31.9 BIPOLAR DISORDER (HCC): Primary | ICD-10-CM

## 2019-03-11 DIAGNOSIS — F31.9 BIPOLAR DISORDER (HCC): ICD-10-CM

## 2019-03-11 DIAGNOSIS — R45.851 SUICIDAL IDEATIONS: Primary | ICD-10-CM

## 2019-03-11 LAB
ALBUMIN SERPL BCP-MCNC: 4.2 G/DL (ref 3.5–5)
ALP SERPL-CCNC: 68 U/L (ref 46–116)
ALT SERPL W P-5'-P-CCNC: 25 U/L (ref 12–78)
AMORPH URATE CRY URNS QL MICRO: ABNORMAL /HPF
AMPHETAMINES SERPL QL SCN: POSITIVE
ANION GAP SERPL CALCULATED.3IONS-SCNC: 11 MMOL/L (ref 4–13)
APAP SERPL-MCNC: <2 UG/ML (ref 10–30)
AST SERPL W P-5'-P-CCNC: 30 U/L (ref 5–45)
BACTERIA UR QL AUTO: ABNORMAL /HPF
BARBITURATES UR QL: NEGATIVE
BASOPHILS # BLD AUTO: 0.06 THOUSANDS/ΜL (ref 0–0.1)
BASOPHILS NFR BLD AUTO: 1 % (ref 0–1)
BENZODIAZ UR QL: NEGATIVE
BILIRUB SERPL-MCNC: 0.6 MG/DL (ref 0.2–1)
BILIRUB UR QL STRIP: ABNORMAL
BUN SERPL-MCNC: 13 MG/DL (ref 5–25)
CALCIUM SERPL-MCNC: 9.3 MG/DL (ref 8.3–10.1)
CHLORIDE SERPL-SCNC: 101 MMOL/L (ref 100–108)
CLARITY UR: ABNORMAL
CO2 SERPL-SCNC: 27 MMOL/L (ref 21–32)
COCAINE UR QL: NEGATIVE
COLOR UR: YELLOW
CREAT SERPL-MCNC: 0.75 MG/DL (ref 0.6–1.3)
EOSINOPHIL # BLD AUTO: 0.19 THOUSAND/ΜL (ref 0–0.61)
EOSINOPHIL NFR BLD AUTO: 2 % (ref 0–6)
ERYTHROCYTE [DISTWIDTH] IN BLOOD BY AUTOMATED COUNT: 13.6 % (ref 11.6–15.1)
ETHANOL EXG-MCNC: 0 MG/DL
EXT PREG TEST URINE: NEGATIVE
GFR SERPL CREATININE-BSD FRML MDRD: 109 ML/MIN/1.73SQ M
GLUCOSE SERPL-MCNC: 67 MG/DL (ref 65–140)
GLUCOSE SERPL-MCNC: 80 MG/DL (ref 65–140)
GLUCOSE UR STRIP-MCNC: NEGATIVE MG/DL
HCG SERPL QL: NEGATIVE
HCT VFR BLD AUTO: 41.5 % (ref 34.8–46.1)
HGB BLD-MCNC: 14 G/DL (ref 11.5–15.4)
HGB UR QL STRIP.AUTO: ABNORMAL
IMM GRANULOCYTES # BLD AUTO: 0.03 THOUSAND/UL (ref 0–0.2)
IMM GRANULOCYTES NFR BLD AUTO: 0 % (ref 0–2)
KETONES UR STRIP-MCNC: ABNORMAL MG/DL
LEUKOCYTE ESTERASE UR QL STRIP: NEGATIVE
LYMPHOCYTES # BLD AUTO: 2.84 THOUSANDS/ΜL (ref 0.6–4.47)
LYMPHOCYTES NFR BLD AUTO: 25 % (ref 14–44)
MAGNESIUM SERPL-MCNC: 1.9 MG/DL (ref 1.6–2.6)
MCH RBC QN AUTO: 30.8 PG (ref 26.8–34.3)
MCHC RBC AUTO-ENTMCNC: 33.7 G/DL (ref 31.4–37.4)
MCV RBC AUTO: 91 FL (ref 82–98)
METHADONE UR QL: NEGATIVE
MONOCYTES # BLD AUTO: 1.29 THOUSAND/ΜL (ref 0.17–1.22)
MONOCYTES NFR BLD AUTO: 11 % (ref 4–12)
MUCOUS THREADS UR QL AUTO: ABNORMAL
NEUTROPHILS # BLD AUTO: 7.15 THOUSANDS/ΜL (ref 1.85–7.62)
NEUTS SEG NFR BLD AUTO: 61 % (ref 43–75)
NITRITE UR QL STRIP: NEGATIVE
NON-SQ EPI CELLS URNS QL MICRO: ABNORMAL /HPF
NRBC BLD AUTO-RTO: 0 /100 WBCS
OPIATES UR QL SCN: NEGATIVE
PCP UR QL: NEGATIVE
PH UR STRIP.AUTO: 6 [PH]
PLATELET # BLD AUTO: 367 THOUSANDS/UL (ref 149–390)
PMV BLD AUTO: 9 FL (ref 8.9–12.7)
POTASSIUM SERPL-SCNC: 3.4 MMOL/L (ref 3.5–5.3)
PROT SERPL-MCNC: 7.9 G/DL (ref 6.4–8.2)
PROT UR STRIP-MCNC: ABNORMAL MG/DL
RBC # BLD AUTO: 4.55 MILLION/UL (ref 3.81–5.12)
RBC #/AREA URNS AUTO: ABNORMAL /HPF
SALICYLATES SERPL-MCNC: 4.1 MG/DL (ref 3–20)
SODIUM SERPL-SCNC: 139 MMOL/L (ref 136–145)
SP GR UR STRIP.AUTO: >=1.03 (ref 1–1.03)
THC UR QL: POSITIVE
TSH SERPL DL<=0.05 MIU/L-ACNC: 1.2 UIU/ML (ref 0.36–3.74)
UROBILINOGEN UR QL STRIP.AUTO: 0.2 E.U./DL
WBC # BLD AUTO: 11.56 THOUSAND/UL (ref 4.31–10.16)
WBC #/AREA URNS AUTO: ABNORMAL /HPF

## 2019-03-11 PROCEDURE — 99283 EMERGENCY DEPT VISIT LOW MDM: CPT

## 2019-03-11 PROCEDURE — 84443 ASSAY THYROID STIM HORMONE: CPT | Performed by: EMERGENCY MEDICINE

## 2019-03-11 PROCEDURE — 80307 DRUG TEST PRSMV CHEM ANLYZR: CPT | Performed by: EMERGENCY MEDICINE

## 2019-03-11 PROCEDURE — 96361 HYDRATE IV INFUSION ADD-ON: CPT

## 2019-03-11 PROCEDURE — 93005 ELECTROCARDIOGRAM TRACING: CPT

## 2019-03-11 PROCEDURE — 82075 ASSAY OF BREATH ETHANOL: CPT | Performed by: EMERGENCY MEDICINE

## 2019-03-11 PROCEDURE — 81025 URINE PREGNANCY TEST: CPT | Performed by: EMERGENCY MEDICINE

## 2019-03-11 PROCEDURE — 85025 COMPLETE CBC W/AUTO DIFF WBC: CPT | Performed by: EMERGENCY MEDICINE

## 2019-03-11 PROCEDURE — 80053 COMPREHEN METABOLIC PANEL: CPT | Performed by: EMERGENCY MEDICINE

## 2019-03-11 PROCEDURE — 84703 CHORIONIC GONADOTROPIN ASSAY: CPT | Performed by: EMERGENCY MEDICINE

## 2019-03-11 PROCEDURE — 99285 EMERGENCY DEPT VISIT HI MDM: CPT

## 2019-03-11 PROCEDURE — 81001 URINALYSIS AUTO W/SCOPE: CPT | Performed by: EMERGENCY MEDICINE

## 2019-03-11 PROCEDURE — 83735 ASSAY OF MAGNESIUM: CPT | Performed by: EMERGENCY MEDICINE

## 2019-03-11 PROCEDURE — 96374 THER/PROPH/DIAG INJ IV PUSH: CPT

## 2019-03-11 PROCEDURE — 80329 ANALGESICS NON-OPIOID 1 OR 2: CPT | Performed by: EMERGENCY MEDICINE

## 2019-03-11 PROCEDURE — 82948 REAGENT STRIP/BLOOD GLUCOSE: CPT

## 2019-03-11 PROCEDURE — 36415 COLL VENOUS BLD VENIPUNCTURE: CPT | Performed by: EMERGENCY MEDICINE

## 2019-03-11 RX ORDER — GABAPENTIN 800 MG/1
800 TABLET ORAL 3 TIMES DAILY
COMMUNITY

## 2019-03-11 RX ORDER — NALTREXONE HYDROCHLORIDE 50 MG/1
50 TABLET, FILM COATED ORAL DAILY
COMMUNITY

## 2019-03-11 RX ORDER — IBUPROFEN 600 MG/1
600 TABLET ORAL ONCE
Status: COMPLETED | OUTPATIENT
Start: 2019-03-11 | End: 2019-03-11

## 2019-03-11 RX ORDER — BUPROPION HYDROCHLORIDE 150 MG/1
150 TABLET ORAL DAILY
COMMUNITY

## 2019-03-11 RX ORDER — HALOPERIDOL 5 MG/ML
10 INJECTION INTRAMUSCULAR ONCE
Status: DISCONTINUED | OUTPATIENT
Start: 2019-03-11 | End: 2019-03-11

## 2019-03-11 RX ORDER — LORAZEPAM 2 MG/ML
2 INJECTION INTRAMUSCULAR ONCE
Status: COMPLETED | OUTPATIENT
Start: 2019-03-11 | End: 2019-03-11

## 2019-03-11 RX ORDER — ACETAMINOPHEN 325 MG/1
975 TABLET ORAL ONCE
Status: COMPLETED | OUTPATIENT
Start: 2019-03-11 | End: 2019-03-11

## 2019-03-11 RX ADMIN — ACETAMINOPHEN 975 MG: 325 TABLET, FILM COATED ORAL at 20:37

## 2019-03-11 RX ADMIN — SODIUM CHLORIDE 1000 ML: 0.9 INJECTION, SOLUTION INTRAVENOUS at 19:07

## 2019-03-11 RX ADMIN — LORAZEPAM 2 MG: 2 INJECTION INTRAMUSCULAR; INTRAVENOUS at 17:52

## 2019-03-11 RX ADMIN — IBUPROFEN 600 MG: 600 TABLET ORAL at 20:37

## 2019-03-11 NOTE — ED NOTES
Attempted multiple blood draws on patient without success  Patient was breathalyzed with an alcohol of 0 000    Patient was cooperative, loud at times, redirectable  Patient given feminine products and underwear  Remained on observation throughout      Patient presently sitting on stretcher picking at skin on hands     [facility], [address] [place] [address]  03/11/19 7670
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
## 2019-03-11 NOTE — ED NOTES
Discharge instructions reviewed with patient  Belongings returned to patient  Discharged       Keon Montgomery RN  03/11/19 4143

## 2019-03-11 NOTE — ED NOTES
Patient's father Elodia Cornelius called [phone] , patient cannot return to his residence and he does not want her to know that he called        Jon Lam RN  03/11/19 1930

## 2019-03-11 NOTE — DISCHARGE INSTRUCTIONS
Please take a list of all of your medications and discharge paperwork with you to all of your follow-up medical visits  Please take all of your medications as directed  Please call your family doctor or return to the ER if you have increased shortness of breath, chest pain, fevers, chills, nausea, vomiting, diarrhea, or any other worsening symptoms  Suicide Prevention for Adults   WHAT YOU NEED TO KNOW:   What do I need to know about suicide prevention? A person may see suicide as the only way to escape emotional or physical pain and suffering  You can help provide emotional support for him or her and get the help he or she needs  Learn to recognize warning signs that the person may be considering suicide  Find resources to help prevent him or her from attempting to take his or her life  What should I do if I think the person is considering suicide? Call 911 if you feel the person is at immediate risk of suicide, or if he or she talks about an active suicide plan  Assume that the person intends to carry out his or her plan  Resources are available to help you and the person  The following are some things you can do:  · Call the [address] [place] [address] at [phone] ([phone])   · Call the Suicide Hotline at 5-875-ZUCZFGX (7-543.769.3360)   · Contact the person's therapist  His or her healthcare provider can give you a list of therapists if he or she does not have one  · Keep medicines, weapons, and alcohol out of the person's reach  Do not leave the person alone if he or she says they want to commit suicide  Do not leave the person alone if you think he or she may try it  Make sure you do not put yourself at risk if the person has a weapon  · Do not be afraid to ask if the person is thinking of ending his or her life  Ask if the person has a plan for hurting or killing himself or herself  What warning signs should I watch for?    · Talking about a plan for committing suicide, or suddenly deciding to make a will    · Cutting himself or herself, burning the skin with cigarettes, or driving recklessly    · Drug or alcohol use, not taking prescribed medicine, or taking too much prescribed medicine    · Sudden anger, lashing out at others, or seeming hopeless, anxious, or angry and then suddenly becoming happy or peaceful    · Not wanting to spend time with others or doing things he or she usually enjoys    · Trouble at work, or not showing up for work    · A change in the way he or she eats, sleeps, or dresses    · Weight gain or loss or having less energy than usual    · Trouble sleeping or spending a lot of time sleeping    · Giving away or throwing away his belongings  What increases the risk for suicide? · Depression or chronic sleep disorders such as insomnia    · Alcohol or drug use    · Death of an important person, or the anniversary of that person's death    · A past suicide attempt, or someone close to him or her attempted or committed suicide    · Mental illness, such as schizophrenia, bipolar disorder, or posttraumatic stress disorder (PTSD)     · Chronic pain, or a serious illness, such as heart disease, cancer, or AIDS    · Being physically dependent on others    · Mental, physical, or sexual abuse    · A history of violence or aggression toward others, or feeling guilty for hurting someone else    · Stress from divorce or a breakup, or loss of a friendship, or loneliness    · Struggling with being gaitan, lesbian, or bisexual    · Stress from the loss of a job, or a stressful job  How will healthcare providers help the person? · Healthcare providers will ask questions about the person's suicide thoughts and plans  They will ask how often he or she thinks about suicide and if he or she has tried it before  They will ask if he or she hurt himself, such as with cutting or reckless driving  They may ask if he or she has access to weapons or drugs       · A healthcare provider will help the person create a safety plan  The plan includes a list of people or groups to contact if he or she has suicidal thoughts again  The list may include friends, family members, a spiritual leader, and others he or she trusts  The person may be asked to make a verbal agreement or sign a contract that he or she will not try to harm himself  What treatment may the person need? · Medicines  may be given to prevent mood swings, or to decrease anxiety or depression  The person will need to take all medicines as directed  A sudden stop can be harmful  It may take 4 to 6 weeks for the medicine to help him or her feel better  · A therapist  can help the person identify and change negative feelings or beliefs about himself or herself  This may also help change the way the he or she feels and acts  A therapist can also help the person find ways to cope with things that cannot be changed  What can I do to help the person? · Encourage the person to seek help for drug or alcohol abuse  Drugs and alcohol can increase suicidal thoughts and make the person more likely to act on them  · Help the person connect with others  Encourage him or her to become involved in the community  Some examples include tutoring a young student, volunteering at a [employer], or joining a group exercise program  The person may need help setting up a computer or creating an e-mail account to help him or her remain connected to others  · Exercise with the person  Exercise can lift his or her mood, increase energy, and make it easier to sleep at night  · Encourage the person to try new things  Adults who are open to new experiences handle stress and change better than those who are not  · Call, visit, or send postcards to the person often  Check on him or her after the loss of a pet, longtime friend, or child  Holidays, birthdays, and anniversaries can be difficult for a person after a loss   The loss of a spouse can be especially painful and lonely  · Help the person schedule a visit with his or her Mormonism or spiritual leader  A Mormonism or spiritual leader may be able to offer additional support and resources to the person  · Help the person get equipment that will increase his or her comfort and mobility  Examples are hearing aids, glasses, large print books, and walkers  These can help him or her enjoy activities and feel more independent  · Encourage the person to continue taking medicine and going to therapy  Medicine and therapy can help improve his or her mental health  Where can I find support and more information? · [facility] , [address] [place] [address]  Phone: 8- 654 - 294-TALK 01 33 [phone])  Web Address: [employer]  · Suicide Awareness Voices of Education  [address] , [address]  Phone: 3- 688 - 622-8891  Web Address: [employer]  org  Call 911 if:   · The person has done something on purpose to hurt himself or herself  · The person tries to commit suicide  When should I seek immediate care? · The person tells you he or she made a plan to commit suicide  · The person acts out in anger, is reckless, or is abusing alcohol or drugs  · The person has serious thoughts of suicide, even with treatment  When should I contact the person's healthcare provider or therapist?   · You begin to see warning signs that the person may be considering suicide  · The person has intense feelings of sadness, anger, revenge, or despair, or he cannot make decisions easily  · The person tells you he or she has more thoughts of suicide when they are alone  · The person withdraws from others  · The person stops eating, or begins to smoke or drink heavily  · The person feels he or she is a burden because of a disability or disease      · The person has trouble dealing with stress, such as a breakup or a job loss     · You have questions or concerns about the person's condition or care  CARE AGREEMENT:   You have the right to help plan your care  Learn about your health condition and how it may be treated  Discuss treatment options with your caregivers to decide what care you want to receive  You always have the right to refuse treatment  The above information is an  only  It is not intended as medical advice for individual conditions or treatments  Talk to your doctor, nurse or pharmacist before following any medical regimen to see if it is safe and effective for you  © 2017 [facility] Information is for End User's use only and may not be sold, redistributed or otherwise used for commercial purposes  All illustrations and images included in CareNotes® are the copyrighted property of A D A M , [employer]  or [employer]  Polysubstance Abuse   WHAT YOU NEED TO KNOW:   Polysubstance abuse is the abuse of 2 or more drugs that cause impairment or distress  Examples include alcohol, nicotine, marijuana, cocaine, heroin, methamphetamine, hallucinogens such as mushrooms, or inhalants such as paint thinner  Prescribed medicines, such as opioids for pain or benzodiazepines for anxiety, are also commonly abused  DISCHARGE INSTRUCTIONS:   Call 911 for any of the following:   · You feel you might harm yourself or others  Return to the emergency department if:   · You have a seizure  · You have chest pain and your heart is beating faster than usual      · You have new shortness of breath  · You are dizzy and lightheaded  Contact your healthcare provider or therapist if:   · You are using drugs and think you are pregnant  · You have withdrawal symptoms and want to start using drugs again  · You have questions or concerns about your condition or care  Risks of polysubstance abuse:   · Drug dependence  is when you continue to use drugs, even when you know the risks  Polysubstance abuse can damage your heart, brain, lungs, liver, and gastrointestinal tract  You continue even when it causes problems with work, school, or relationships  You may have difficulty finding or keeping a job because of your drug dependence  · Drug tolerance  is when you need to use more drugs, or use them more often, to get the effects you want  You may not be able to stop using the drugs  When you try to stop, you may have withdrawal symptoms and strong cravings for the drugs  · Drug overdose  can occur when you take more drugs than your body can handle  This may be a small amount or a large amount  You can lose consciousness or have a seizure or stroke  Your heart can stop beating, or you can stop breathing  You may die from a drug overdose  Medicines:   · Withdrawal medicines  may be given according to the types of drugs you are abusing  Withdrawal from drugs can cause serious, life-threatening side effects  Certain medicines can help decrease your withdrawal symptoms and your desire for the drug  Ask for more information about the withdrawal medicines you may need  · Mood stabilizers  may be given to help prevent or treat depression or anxiety caused by drug abuse and withdrawal      · Take your medicine as directed  Contact your healthcare provider if you think your medicine is not helping or if you have side effects  Tell him or her if you are allergic to any medicine  Keep a list of the medicines, vitamins, and herbs you take  Include the amounts, and when and why you take them  Bring the list or the pill bottles to follow-up visits  Carry your medicine list with you in case of an emergency  Follow up with your healthcare provider as directed: You may be referred to a specialist to treat health conditions caused by your drug use  This includes mental health, heart, or lung specialists  Write down your questions so you remember to ask them during your visits     Therapy:  You may need therapy and support to stop using drugs:  · Cognitive and behavioral therapy  helps you change your thinking and behavior  It can help you develop plans to avoid the situations that make you want to use drugs  It also helps you cope with the feelings of wanting to use drugs  You may have individual or group therapy  · Contingency management  helps you set drug-free goals with a therapist  Jorge Fagan will decide ways to celebrate your success when you reach a goal      · Family therapy and support groups  allow you and your family members to talk to and be encouraged by other people affected by drug abuse  You and your family members may attend together or separately  Ask your healthcare provider for information about programs in your area  How polysubstance abuse affects unborn or  babies:   · If you are pregnant or get pregnant while using drugs, you may have a miscarriage or give birth early  Your baby may be born addicted to the drugs  · Do not breastfeed your baby if you use drugs  Drugs pass from your bloodstream into your breast milk and affect your baby's health  Talk with your healthcare provider if you are using drugs and breastfeeding  For support and more information:   · Alcoholics Anonymous  Web Address: http://[employer]/  · HUBERT Romero on Drug and Alcohol Information  Phone: 0- 212 - 7476594  Web Address: [employer]  · [employer] on Alcoholism and Drug Dependence  Shaun Carreon [address] [address]  Phone: 9- 772 - 236-0136  Phone: 8- 938 - 897-8370  Web Address: [employer]   org  2017 Luc Corado Information is for End User's use only and may not be sold, redistributed or otherwise used for commercial purposes  All illustrations and images included in CareNotes® are the copyrighted property of [employer] , [employer]  or [employer]  The above information is an  only   It is not intended as medical advice for individual conditions or treatments  Talk to your doctor, nurse or pharmacist before following any medical regimen to see if it is safe and effective for you
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
## 2019-03-11 NOTE — ED NOTES
Patient is pacing back and forth in common area  Patient from other room is speaking to her and causing her to become agitated  Encouraged patient to stay in room and not engage with patient  Patient states "I've never been back here before when I've been okay, who do you think this makes me feel?"    Patient has returned to her room, standing in the doorway       Komal White, DEANGELO  03/11/19 0999

## 2019-03-11 NOTE — ED NOTES
Pt screaming at this time, paranoid & disliking me  Causing scene after running out of room, escorted back into room with police & security at bedside        Joyce Jennings RN  03/11/19 2918

## 2019-03-11 NOTE — ED NOTES
Spoke with ER attending - patient will not clear until 23:00 due to ingesting her Rx's  Valdez Kilgore / Michael Adams made aware and chart from earlier in day faxed to them @ 19:25  Valdez Kilgore / Yolanda Winston informed patient would be clear for screening @ 23:00
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
## 2019-03-12 LAB
ATRIAL RATE: 107 BPM
P AXIS: 62 DEGREES
PR INTERVAL: 144 MS
QRS AXIS: 62 DEGREES
QRSD INTERVAL: 82 MS
QT INTERVAL: 360 MS
QTC INTERVAL: 480 MS
T WAVE AXIS: 29 DEGREES
VENTRICULAR RATE: 107 BPM

## 2019-03-12 PROCEDURE — 93010 ELECTROCARDIOGRAM REPORT: CPT | Performed by: INTERNAL MEDICINE

## 2019-03-12 RX ORDER — CHLORPROMAZINE HYDROCHLORIDE 100 MG/1
200 TABLET, FILM COATED ORAL
Status: DISCONTINUED | OUTPATIENT
Start: 2019-03-12 | End: 2019-03-13 | Stop reason: HOSPADM

## 2019-03-12 RX ORDER — NICOTINE 21 MG/24HR
14 PATCH, TRANSDERMAL 24 HOURS TRANSDERMAL ONCE
Status: DISCONTINUED | OUTPATIENT
Start: 2019-03-12 | End: 2019-03-13 | Stop reason: HOSPADM

## 2019-03-12 RX ORDER — GABAPENTIN 300 MG/1
300 CAPSULE ORAL 3 TIMES DAILY
Status: DISCONTINUED | OUTPATIENT
Start: 2019-03-12 | End: 2019-03-13 | Stop reason: HOSPADM

## 2019-03-12 RX ORDER — TRAZODONE HYDROCHLORIDE 50 MG/1
200 TABLET ORAL
Status: DISCONTINUED | OUTPATIENT
Start: 2019-03-12 | End: 2019-03-13 | Stop reason: HOSPADM

## 2019-03-12 RX ORDER — LORAZEPAM 0.5 MG/1
0.5 TABLET ORAL ONCE
Status: COMPLETED | OUTPATIENT
Start: 2019-03-12 | End: 2019-03-12

## 2019-03-12 RX ORDER — BUPROPION HYDROCHLORIDE 100 MG/1
100 TABLET, EXTENDED RELEASE ORAL DAILY
Status: DISCONTINUED | OUTPATIENT
Start: 2019-03-12 | End: 2019-03-13 | Stop reason: HOSPADM

## 2019-03-12 RX ADMIN — GABAPENTIN 300 MG: 300 CAPSULE ORAL at 11:58

## 2019-03-12 RX ADMIN — GABAPENTIN 300 MG: 300 CAPSULE ORAL at 16:33

## 2019-03-12 RX ADMIN — NICOTINE 14 MG: 14 PATCH TRANSDERMAL at 11:58

## 2019-03-12 RX ADMIN — BUPROPION HYDROCHLORIDE 100 MG: 100 TABLET, EXTENDED RELEASE ORAL at 11:58

## 2019-03-12 RX ADMIN — LORAZEPAM 0.5 MG: 0.5 TABLET ORAL at 18:11

## 2019-03-12 RX ADMIN — GABAPENTIN 300 MG: 300 CAPSULE ORAL at 20:55

## 2019-03-12 RX ADMIN — CHLORPROMAZINE HYDROCHLORIDE 200 MG: 100 TABLET, SUGAR COATED ORAL at 20:57

## 2019-03-12 RX ADMIN — SERTRALINE HYDROCHLORIDE 50 MG: 50 TABLET ORAL at 11:58

## 2019-03-12 RX ADMIN — TRAZODONE HYDROCHLORIDE 200 MG: 50 TABLET ORAL at 21:04

## 2019-03-12 NOTE — ED NOTES
Patient noted to be asleep in room at this time  Lunch tray held until patient wakes       Stuart Ponce RN  03/12/19 4678

## 2019-03-12 NOTE — ED NOTES
Pt noted to be resting quietly with eyes closed, 1:1 observation in place        Rg Hernandez RN  03/11/19 3606

## 2019-03-12 NOTE — EMTALA/ACUTE CARE TRANSFER
[facility] EMERGENCY DEPARTMENT  [address] [facility] [address]  Dept: [phone]      EMTALA TRANSFER CONSENT    NAME Rosy Chester                                         1990                              MRN [id number]    I have been informed of my rights regarding examination, treatment, and transfer   by Dr Chandni Hill DO    Benefits: Specialized equipment and/or services available at the receiving facility (Include comment)________________________    Risks: Potential for delay in receiving treatment, Potential deterioration of medical condition, Increased discomfort during transfer, Possible worsening of condition or death during transfer      Consent for Transfer:  I acknowledge that my medical condition has been evaluated and explained to me by the emergency department physician or other qualified medical person and/or my attending physician, who has recommended that I be transferred to the service of  Accepting Physician: Dr Lonny Fleming at [address] [facility] Name, [address] : [facility]  The above potential benefits of such transfer, the potential risks associated with such transfer, and the probable risks of not being transferred have been explained to me, and I fully understand them  The doctor has explained that, in my case, the benefits of transfer outweigh the risks  I agree to be transferred  I authorize the performance of emergency medical procedures and treatments upon me in both transit and upon arrival at the receiving facility  Additionally, I authorize the release of any and all medical records to the receiving facility and request they be transported with me, if possible  I understand that the safest mode of transportation during a medical emergency is an ambulance and that the Hospital advocates the use of this mode of transport   Risks of traveling to the receiving facility by car, including absence of medical control, life sustaining equipment, such as oxygen, and medical personnel has been explained to me and I fully understand them  (LORAINE CORRECT BOX BELOW)  [  ]  I consent to the stated transfer and to be transported by ambulance/helicopter  [  ]  I consent to the stated transfer, but refuse transportation by ambulance and accept full responsibility for my transportation by car  I understand the risks of non-ambulance transfers and I exonerate the Hospital and its staff from any deterioration in my condition that results from this refusal     X___________________________________________    DATE  19  TIME________  Signature of patient or legally responsible individual signing on patient behalf           RELATIONSHIP TO PATIENT_________________________          Provider Certification    NAME Tommy Campoverde                                         1990                              MRN [id number]    A medical screening exam was performed on the above named patient  Based on the examination:    Condition Necessitating Transfer The primary encounter diagnosis was Suicidal ideations  Diagnoses of Overdose, Bipolar disorder ([facility] [address] ), and Polysubstance abuse ([facility] [address] ) were also pertinent to this visit      Patient Condition: The patient has been stabilized such that within reasonable medical probability, no material deterioration of the patient condition or the condition of the unborn child(alberto) is likely to result from the transfer    Reason for Transfer: Level of Care needed not available at this facility    Transfer Requirements: [address]   · Space available and qualified personnel available for treatment as acknowledged by    · Agreed to accept transfer and to provide appropriate medical treatment as acknowledged by       Dr Marcos Laureano  · Appropriate medical records of the examination and treatment of the patient are provided at the time of transfer   [address] _______  · Transfer will be performed by qualified personnel from    and appropriate transfer equipment as required, including the use of necessary and appropriate life support measures  Provider Certification: I have examined the patient and explained the following risks and benefits of being transferred/refusing transfer to the patient/family:  General risk, such as traffic hazards, adverse weather conditions, rough terrain or turbulence, possible failure of equipment (including vehicle or aircraft), or consequences of actions of persons outside the control of the transport personnel, Unanticipated needs of medical equipment and personnel during transport, Risk of worsening condition, The possibility of a transport vehicle being unavailable, The patient is stable for psychiatric transfer because they are medically stable, and is protected from harming him/herself or others during transport      Based on these reasonable risks and benefits to the patient and/or the unborn child(alberto), and based upon the information available at the time of the patients examination, I certify that the medical benefits reasonably to be expected from the provision of appropriate medical treatments at another medical facility outweigh the increasing risks, if any, to the individuals medical condition, and in the case of labor to the unborn child, from effecting the transfer      X____________________________________________ DATE 03/12/19        TIME_______      ORIGINAL - SEND TO MEDICAL RECORDS   COPY - SEND WITH PATIENT DURING TRANSFER

## 2019-03-12 NOTE — ED NOTES
Patient yelling wanting to go home, unsure of why still here     Venkata Aleman, DEANGELO  03/12/19 3989

## 2019-03-12 NOTE — ED NOTES
Care transferred to [facility]          Yvette Holt, DEANGELO  03/12/19 6657

## 2019-03-12 NOTE — ED NOTES
Pt requesting "a sandwich to stuff in my face"  Made pt aware that I would check with the doctor to see if she can eat, pt then requested a hot diner  Informed pt that we only have sandwiches, she then stated "actually I'm on a hunger strike"  Before leaving pt again requested a sandwich        Ramesh White RN  03/11/19 2042
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
## 2019-03-12 NOTE — ED NOTES
Patient agitated after speaking to family guidance, patient decided to go voluntary and did tell family guidance she is suicidal, patient then started yelling and stating has not had anything to eat in days, given a turkey sandwich and requesting meds     Pedro Luis Fitzpatrick RN  03/12/19 3333

## 2019-03-12 NOTE — ED NOTES
Patient transferred to room 21, was eating pretzels prior to transfer, complaining of being cold       Harshal Martin RN  03/11/19 7034

## 2019-03-12 NOTE — ED NOTES
Pt reports taking 800mg Neurontin at home  Order is for 300mg  Dr Barlow Sales made aware         Adolfo Hughes, RN  03/12/19 0262

## 2019-03-12 NOTE — ED NOTES
Pt was presented with PFA and restraining order from the [place] [employer]  She was upset, crying and anxious upon hearing them read the order to her  Was able to be calmed down, allowed staff to obtain her vitals and was told she will be getting a shower once the bathroom was free       Eva Sandra  03/12/19 8648

## 2019-03-12 NOTE — ED NOTES
Patient noted to be asleep in room at this time  Patient remains continual observation in the secured holding area       Wilbur Saldivar RN  03/12/19 1400

## 2019-03-12 NOTE — ED NOTES
3/12/19 @ 0826:  PES updated by Oral Huynh at [facility], in that patient is "voluntary "  PES reports that patient stated to RN @ 8750 that she wanted to go home and is unsure of why she is still here  PES does not feel this is a "voluntary" patient, and requested re-evaluation  Oral Huynh says she will come to ED to assess  MS Arjun  1100: Oral Huynh from [facility] re-assessed patient, and patient is currently voluntary for inpatient treatment; PES will begin bed search  [address] [place] : PES called [facility]; beds available at [facility] and [facility]; PES faxed referral   [address] [place]  1430: Patient signed voluntary consent  MS Arjun  1440: PES faxed voluntary consent, height and weight, and breathalyzer results to [facility]  [address], [address] [facility]: Received call from [facility]:  Patient is accepted at [facility] [place]  Patient is accepted by Dr Cody Duffy  Nurse report is to be called to ** prior to patient transfer  [address] [place]: PES called insurance:  Insurance Authorization:   Phone call placed to [employer] number: [phone]  Spoke to Deejay Edwards, who reported that clinician will call back, or we can fax clinical information to: [phone], and insurance will call back with approval information; PES faxed clinical and is waiting for return call; reference number is: [id number]    Kenji Cordovademi MS

## 2019-03-12 NOTE — ED NOTES
Patient was medically cleared for crisis at 11:00 p m  Which was made clear by Dr Cardoso Newer note, however clarification was needed for crisis to see her       Vita Pinto MD  03/12/19 6736

## 2019-03-12 NOTE — ED NOTES
Patient resting in bed  Vitals obtained and plan of care gone over with patient  Patient aware that she waiting on placement  Aware that breakfast was ordered  Offers no complaints at this time  Patient is calm and cooperative        Susanne Archuleta RN  03/12/19 8663

## 2019-03-12 NOTE — ED NOTES
Patient is resting comfortably  Pt calm,cooperative at this time       Eli Maloney RN  03/12/19 1782

## 2019-03-13 VITALS
HEART RATE: 64 BPM | BODY MASS INDEX: 25.71 KG/M2 | WEIGHT: 160 LBS | TEMPERATURE: 97.6 F | HEIGHT: 66 IN | SYSTOLIC BLOOD PRESSURE: 107 MMHG | OXYGEN SATURATION: 98 % | RESPIRATION RATE: 15 BRPM | DIASTOLIC BLOOD PRESSURE: 56 MMHG

## 2019-03-13 NOTE — ED NOTES
[address] [facility] called to give report Reina Wilson will call back for report  Pt continues resting quietly       Author REINA Martinez  03/13/19 4242

## 2019-12-18 ENCOUNTER — HOSPITAL ENCOUNTER (EMERGENCY)
Facility: HOSPITAL | Age: 29
Discharge: HOME | End: 2019-12-18
Attending: EMERGENCY MEDICINE | Admitting: EMERGENCY MEDICINE
Payer: COMMERCIAL

## 2019-12-18 VITALS
SYSTOLIC BLOOD PRESSURE: 136 MMHG | HEART RATE: 72 BPM | DIASTOLIC BLOOD PRESSURE: 82 MMHG | TEMPERATURE: 98.6 F | OXYGEN SATURATION: 98 % | RESPIRATION RATE: 18 BRPM

## 2019-12-18 DIAGNOSIS — Z00.8 ENCOUNTER FOR PSYCHOLOGICAL EVALUATION: Primary | ICD-10-CM

## 2019-12-18 LAB
AMORPH URATE CRY URNS QL MICRO: ABNORMAL /HPF
AMPHETAMINES SERPL QL SCN: NEGATIVE
BACTERIA UR QL AUTO: ABNORMAL /HPF
BARBITURATES UR QL: NEGATIVE
BENZODIAZ UR QL: NEGATIVE
BILIRUB UR QL STRIP: NEGATIVE
CLARITY UR: ABNORMAL
COCAINE UR QL: NEGATIVE
COLOR UR: YELLOW
EXT PREG TEST URINE: NEGATIVE
EXT. CONTROL ED NAV: NORMAL
GLUCOSE UR STRIP-MCNC: NEGATIVE MG/DL
HGB UR QL STRIP.AUTO: ABNORMAL
KETONES UR STRIP-MCNC: NEGATIVE MG/DL
LEUKOCYTE ESTERASE UR QL STRIP: NEGATIVE
METHADONE UR QL: NEGATIVE
MUCOUS THREADS UR QL AUTO: ABNORMAL
NITRITE UR QL STRIP: NEGATIVE
NON-SQ EPI CELLS URNS QL MICRO: ABNORMAL /HPF
OPIATES UR QL SCN: NEGATIVE
PCP UR QL: NEGATIVE
PH UR STRIP.AUTO: 5 [PH]
PROT UR STRIP-MCNC: NEGATIVE MG/DL
RBC #/AREA URNS AUTO: ABNORMAL /HPF
SP GR UR STRIP.AUTO: >=1.03 (ref 1–1.03)
THC UR QL: POSITIVE
UROBILINOGEN UR QL STRIP.AUTO: 0.2 E.U./DL
WBC #/AREA URNS AUTO: ABNORMAL /HPF

## 2019-12-18 PROCEDURE — 80307 DRUG TEST PRSMV CHEM ANLYZR: CPT | Performed by: EMERGENCY MEDICINE

## 2019-12-18 PROCEDURE — 81025 URINE PREGNANCY TEST: CPT | Performed by: EMERGENCY MEDICINE

## 2019-12-18 PROCEDURE — 81001 URINALYSIS AUTO W/SCOPE: CPT | Performed by: EMERGENCY MEDICINE

## 2019-12-18 PROCEDURE — 99284 EMERGENCY DEPT VISIT MOD MDM: CPT
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
## 2019-12-27 ENCOUNTER — HOSPITAL ENCOUNTER (EMERGENCY)
Facility: HOSPITAL | Age: 29
Discharge: ANOTHER HOSPITAL | End: 2019-12-28
Attending: EMERGENCY MEDICINE | Admitting: EMERGENCY MEDICINE
Payer: COMMERCIAL

## 2019-12-27 DIAGNOSIS — F31.9 BIPOLAR 1 DISORDER (HCC): ICD-10-CM

## 2019-12-27 DIAGNOSIS — F10.929 ALCOHOL INTOXICATION (HCC): Primary | ICD-10-CM

## 2019-12-27 LAB
ALBUMIN SERPL BCP-MCNC: 4.1 G/DL (ref 3.5–5)
ALP SERPL-CCNC: 61 U/L (ref 46–116)
ALT SERPL W P-5'-P-CCNC: 15 U/L (ref 12–78)
AMPHETAMINES SERPL QL SCN: NEGATIVE
ANION GAP SERPL CALCULATED.3IONS-SCNC: 16 MMOL/L (ref 4–13)
AST SERPL W P-5'-P-CCNC: 22 U/L (ref 5–45)
BACTERIA UR QL AUTO: ABNORMAL /HPF
BARBITURATES UR QL: NEGATIVE
BASOPHILS # BLD AUTO: 0.04 THOUSANDS/ΜL (ref 0–0.1)
BASOPHILS NFR BLD AUTO: 1 % (ref 0–1)
BENZODIAZ UR QL: NEGATIVE
BILIRUB SERPL-MCNC: 0.2 MG/DL (ref 0.2–1)
BILIRUB UR QL STRIP: NEGATIVE
BUN SERPL-MCNC: 5 MG/DL (ref 5–25)
CALCIUM SERPL-MCNC: 8.4 MG/DL (ref 8.3–10.1)
CHLORIDE SERPL-SCNC: 106 MMOL/L (ref 100–108)
CLARITY UR: CLEAR
CO2 SERPL-SCNC: 20 MMOL/L (ref 21–32)
COCAINE UR QL: NEGATIVE
COLOR UR: ABNORMAL
CREAT SERPL-MCNC: 0.78 MG/DL (ref 0.6–1.3)
EOSINOPHIL # BLD AUTO: 0.03 THOUSAND/ΜL (ref 0–0.61)
EOSINOPHIL NFR BLD AUTO: 0 % (ref 0–6)
ERYTHROCYTE [DISTWIDTH] IN BLOOD BY AUTOMATED COUNT: 15.7 % (ref 11.6–15.1)
ETHANOL SERPL-MCNC: 171 MG/DL (ref 0–3)
EXT PREG TEST URINE: NEGATIVE
EXT. CONTROL ED NAV: NORMAL
GFR SERPL CREATININE-BSD FRML MDRD: 103 ML/MIN/1.73SQ M
GLUCOSE SERPL-MCNC: 76 MG/DL (ref 65–140)
GLUCOSE UR STRIP-MCNC: NEGATIVE MG/DL
HCT VFR BLD AUTO: 41.9 % (ref 34.8–46.1)
HGB BLD-MCNC: 13.8 G/DL (ref 11.5–15.4)
HGB UR QL STRIP.AUTO: ABNORMAL
IMM GRANULOCYTES # BLD AUTO: 0.03 THOUSAND/UL (ref 0–0.2)
IMM GRANULOCYTES NFR BLD AUTO: 0 % (ref 0–2)
KETONES UR STRIP-MCNC: NEGATIVE MG/DL
LEUKOCYTE ESTERASE UR QL STRIP: NEGATIVE
LYMPHOCYTES # BLD AUTO: 3.25 THOUSANDS/ΜL (ref 0.6–4.47)
LYMPHOCYTES NFR BLD AUTO: 38 % (ref 14–44)
MCH RBC QN AUTO: 30.3 PG (ref 26.8–34.3)
MCHC RBC AUTO-ENTMCNC: 32.9 G/DL (ref 31.4–37.4)
MCV RBC AUTO: 92 FL (ref 82–98)
METHADONE UR QL: NEGATIVE
MONOCYTES # BLD AUTO: 0.54 THOUSAND/ΜL (ref 0.17–1.22)
MONOCYTES NFR BLD AUTO: 6 % (ref 4–12)
NEUTROPHILS # BLD AUTO: 4.74 THOUSANDS/ΜL (ref 1.85–7.62)
NEUTS SEG NFR BLD AUTO: 55 % (ref 43–75)
NITRITE UR QL STRIP: NEGATIVE
NON-SQ EPI CELLS URNS QL MICRO: ABNORMAL /HPF
NRBC BLD AUTO-RTO: 0 /100 WBCS
OPIATES UR QL SCN: NEGATIVE
PCP UR QL: NEGATIVE
PH UR STRIP.AUTO: 6 [PH]
PLATELET # BLD AUTO: 390 THOUSANDS/UL (ref 149–390)
PMV BLD AUTO: 8.6 FL (ref 8.9–12.7)
POTASSIUM SERPL-SCNC: 3.6 MMOL/L (ref 3.5–5.3)
PROT SERPL-MCNC: 8.2 G/DL (ref 6.4–8.2)
PROT UR STRIP-MCNC: NEGATIVE MG/DL
RBC # BLD AUTO: 4.56 MILLION/UL (ref 3.81–5.12)
RBC #/AREA URNS AUTO: ABNORMAL /HPF
SODIUM SERPL-SCNC: 142 MMOL/L (ref 136–145)
SP GR UR STRIP.AUTO: <=1.005 (ref 1–1.03)
THC UR QL: POSITIVE
UROBILINOGEN UR QL STRIP.AUTO: 0.2 E.U./DL
WBC # BLD AUTO: 8.63 THOUSAND/UL (ref 4.31–10.16)
WBC #/AREA URNS AUTO: ABNORMAL /HPF

## 2019-12-27 PROCEDURE — 96372 THER/PROPH/DIAG INJ SC/IM: CPT

## 2019-12-27 PROCEDURE — 80320 DRUG SCREEN QUANTALCOHOLS: CPT | Performed by: EMERGENCY MEDICINE

## 2019-12-27 PROCEDURE — 81001 URINALYSIS AUTO W/SCOPE: CPT | Performed by: EMERGENCY MEDICINE

## 2019-12-27 PROCEDURE — 36415 COLL VENOUS BLD VENIPUNCTURE: CPT | Performed by: EMERGENCY MEDICINE

## 2019-12-27 PROCEDURE — 80307 DRUG TEST PRSMV CHEM ANLYZR: CPT | Performed by: EMERGENCY MEDICINE

## 2019-12-27 PROCEDURE — 99285 EMERGENCY DEPT VISIT HI MDM: CPT

## 2019-12-27 PROCEDURE — 85025 COMPLETE CBC W/AUTO DIFF WBC: CPT | Performed by: EMERGENCY MEDICINE

## 2019-12-27 PROCEDURE — 81025 URINE PREGNANCY TEST: CPT | Performed by: EMERGENCY MEDICINE

## 2019-12-27 PROCEDURE — 80053 COMPREHEN METABOLIC PANEL: CPT | Performed by: EMERGENCY MEDICINE

## 2019-12-27 RX ORDER — LORAZEPAM 2 MG/ML
2 INJECTION INTRAMUSCULAR ONCE
Status: COMPLETED | OUTPATIENT
Start: 2019-12-27 | End: 2019-12-27

## 2019-12-27 RX ORDER — HALOPERIDOL 5 MG/ML
5 INJECTION INTRAMUSCULAR ONCE
Status: COMPLETED | OUTPATIENT
Start: 2019-12-27 | End: 2019-12-27

## 2019-12-27 RX ADMIN — HALOPERIDOL LACTATE 5 MG: 5 INJECTION INTRAMUSCULAR at 18:00

## 2019-12-27 RX ADMIN — LORAZEPAM 2 MG: 2 INJECTION INTRAMUSCULAR; INTRAVENOUS at 17:59

## 2019-12-27 NOTE — ED NOTES
Patient currently has her period, got it " when I arrived here "   Sanitary napkin place in patients underwear        Sandor Olvera  12/27/19 1821

## 2019-12-27 NOTE — ED NOTES
Pt attempted to hang off the overhead light  Restrained to bed by police  Security placing restraints on pt       Alexandre Harley RN  12/27/19 6928

## 2019-12-27 NOTE — ED NOTES
Patient belongings :       Tank-top  Sweater  Jacket  Sneakers  Socks  Bra  Hair tie  2 bracelets  1 Lighter  Cover up  [employer] ( ID, 2 credit cards)   Court summons       Labeled and locked in locker        [facility]  12/27/19 0995
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
## 2019-12-28 VITALS
HEART RATE: 71 BPM | WEIGHT: 160.05 LBS | DIASTOLIC BLOOD PRESSURE: 63 MMHG | RESPIRATION RATE: 18 BRPM | TEMPERATURE: 99.1 F | BODY MASS INDEX: 25.83 KG/M2 | SYSTOLIC BLOOD PRESSURE: 109 MMHG | OXYGEN SATURATION: 99 %

## 2019-12-28 RX ORDER — HYDROXYZINE HYDROCHLORIDE 25 MG/1
50 TABLET, FILM COATED ORAL ONCE
Status: COMPLETED | OUTPATIENT
Start: 2019-12-28 | End: 2019-12-28

## 2019-12-28 RX ADMIN — HYDROXYZINE HYDROCHLORIDE 50 MG: 25 TABLET, FILM COATED ORAL at 16:07

## 2019-12-28 NOTE — ED NOTES
Patient sleeping in bed  Respirations easy and unlabored  Continual observation remains       Versa DEANGELO Renteria  12/27/19 2340

## 2019-12-28 NOTE — ED CARE HANDOFF
Emergency Department Sign Out Note        Sign out and transfer of care from previous provider  See Separate Emergency Department note  The patient, Kira Bishop, was evaluated by the previous provider for suicide ideation  Workup Completed:  Medical clearance labs    ED Course / Workup Pending (followup): Family guidance evaluation                          ED Course as of Dec 28 0714   Sat Dec 28, 2019   0105 Patient is awaiting family guidance evaluation  5922 Patient evaluated by family guidance worker  Patient admitted to family guidance worker that she told the police she was suicidal to avoid incarceration  Family guidance worker will obtain collateral and then decide if patient needs tele psych evaluation  Care patient signed out to the next attending who will follow up with family guidance worker for further plans  Procedures  MDM  Number of Diagnoses or Management Options  Alcohol intoxication ([facility] [address] ):      Amount and/or Complexity of Data Reviewed  Clinical lab tests: reviewed  Tests in the medicine section of CPT®: reviewed  Review and summarize past medical records: yes  Independent visualization of images, tracings, or specimens: yes    Risk of Complications, Morbidity, and/or Mortality  General comments: Patient was medically cleared by previous provider  Patient evaluated by family guidance worker  Patient admitted to family guidance worker that she told the police she was suicidal to avoid incarceration  Family guidance worker will obtain collateral from patient's boyfriend and father and then decide if patient needs tele psych evaluation  Care of patient signed out to the next attending who will follow up with family guidance worker for further plans  Patient required Haldol for agitation during previous shift  Patient has been stable during my shift      Patient Progress  Patient progress: stable      Disposition  Final diagnoses:   Alcohol intoxication ([facility] [address] )     Time reflects when diagnosis was documented in both MDM as applicable and the Disposition within this note     Time User Action Codes Description Comment    19/63/2884  2:46 PM Zahraa BRYANT Add [W58 481] Alcohol intoxication [facility])       ED Disposition     None      Follow-up Information    None       Patient's Medications   Discharge Prescriptions    No medications on file     No discharge procedures on file         ED Provider  Electronically Signed by     Erick Bauer DO  12/28/19 2913

## 2019-12-28 NOTE — ED NOTES
13:22 - Reymundo Burkitt from [facility] here to telepsych pt     14:40 - Pt is committed  [facility] to conduct bed search      Aster Bowman MA  Crisis Intervention Worker  12/28/19 2:40 PM

## 2019-12-28 NOTE — ED NOTES
Patient is resting comfortably  Respirations easy and unlabored  1:1 remains       Phi Potter RN  12/27/19 0044

## 2019-12-28 NOTE — ED NOTES
35 yo SWF presented with the Police due to making suicidal statements while intoxicated on etoh (bal was 171 @ 18:07)  Patient was in poor behavioral control when she was placed in room 7 - climbed on the bed and was hanging by her hands from the overhead lighting fixture -  removed patient and she was immediately placed in 4-point restraints - patient was also medicated via Haldol 5 mg and Ativan 2 mg IM (18:00)  Patient would have cleared at 21:00 but was unable to remain alert (or awake) for an assessment  Patient was seen by PES on 12/18/19 and d/c'd at that time  Transporting  reported the patient said the following: "I am going to take pills, also get drunk and jump into traffic"  Alice Gutierrez / [employer] given a warning call that the ER would be calling for a screening once the patient is able to stay awake    Chart was faxed to Alice Gutierrez @ 21:40 - ER will call them once patient is more awake; called and spoke with Terrence Young

## 2019-12-28 NOTE — ED NOTES
Pt given hospital phone to make phone call to father  Water offered  Cooperative at this time  1:1 observation remains       Melissa Phillip RN  12/28/19 9872

## 2019-12-28 NOTE — ED NOTES
12/28/19 @ 26 [id number]:  PES received call from Carlie from family guidance center; patient referred to [facility], [facility], and [facility] SPECIALTY [facility] - [address], [address]: PES received call from Evon Marroquin at family guidance center:  Patient is accepted at [facility]  [facility]  Nurse report is to be called to [phone] prior to patient transfer  1815: PES called family guidance center for additional information, but had to leave a voicemail on the 24/7 crisis line  [address] [place] [address], [address] [place] [address]: PES received return call from Alex Johnson from family guidance center and provided accepting MD and nurse to nurse manju  Guicho Adwoa reports that [facility] would like patient before 2200 or after 2400  PES will set up transport  [address] [place] [address], [address] [place] [address]: PES called [facility]:  Transportation is arranged with [facility] is scheduled for 2000, as per Margaret Recinos  PES informed ED staff and family guidance center, who will inform [facility]    Kenji [facility] [place], [place]

## 2019-12-28 NOTE — ED PROVIDER NOTES
History  Chief Complaint   Patient presents with    Suicidal     Pt arrived with police  Admits to drinking a lot of alcohol, had a fight with her boyfriend and told the officer that she wanted to kill herself  Wanted to run in front of a truck  Belligerent, yelling, ripped clothes off and threw them across the room  Uncooperative  33 yo female brought in by police intoxicated and belligerent  Had fight with boyfriend and threatened to hurt herself  No known injury  Pt  Naaman Bath and yelling at staff  Not cooperative with providing any reliable history  History provided by:  Patient and police   used: No    Suicidal       Prior to Admission Medications   Prescriptions Last Dose Informant Patient Reported? Taking? buPROPion (WELLBUTRIN XL) 150 mg 24 hr tablet Not Taking at Unknown time  Yes No   Sig: Take 150 mg by mouth daily   chlorproMAZINE (THORAZINE) 200 mg tablet Not Taking at Unknown time  Yes No   Sig: Take 200 mg by mouth Medrol Dose Pack scheduling ONLY   gabapentin (NEURONTIN) 800 mg tablet Not Taking at Unknown time  Yes No   Sig: Take 800 mg by mouth 3 (three) times a day   naltrexone (REVIA) 50 mg tablet Not Taking at Unknown time  Yes No   Sig: Take 50 mg by mouth daily   sertraline (ZOLOFT) 50 mg tablet Not Taking at Unknown time  Yes No   Sig: Take 50 mg by mouth daily   traZODone (DESYREL) 100 mg tablet Not Taking at Unknown time  Yes No   Sig: Take 200 mg by mouth daily at bedtime      Facility-Administered Medications: None       Past Medical History:   Diagnosis Date    IV drug user     Psychiatric disorder     Bipolar 1 ,borderline personality disorder       History reviewed  No pertinent surgical history  History reviewed  No pertinent family history  I have reviewed and agree with the history as documented      Social History     Tobacco Use    Smoking status: Current Every Day Smoker     Packs/day: 1 00    Smokeless tobacco: Never Used   Substance Use Topics    Alcohol use: Yes     Comment: occas    Drug use: Yes     Types: Marijuana, Cocaine     Comment:  OD in May,now clean from heroin,still smokes marijuana few times per week        Review of Systems   Unable to perform ROS: Mental status change       Physical Exam  Physical Exam   Constitutional: She appears well-developed and well-nourished  No distress  HENT:   Head: Normocephalic and atraumatic  Eyes: Pupils are equal, round, and reactive to light  Conjunctivae are normal    Neck: Normal range of motion  Neck supple  Cardiovascular: Normal rate, regular rhythm and normal heart sounds  No murmur heard  Pulmonary/Chest: Effort normal and breath sounds normal  No respiratory distress  Abdominal: Soft  Bowel sounds are normal  She exhibits no distension  There is no tenderness  Musculoskeletal: Normal range of motion  She exhibits no edema or deformity  Neurological: She is alert  No cranial nerve deficit  Moving all extremities equally, speech intact   Skin: Skin is warm and dry  No rash noted  She is not diaphoretic  No pallor  Psychiatric:   Agitated, yelling and cursing   Nursing note and vitals reviewed        Vital Signs  ED Triage Vitals   Temperature Pulse Respirations Blood Pressure SpO2   12/27/19 1749 12/27/19 1749 12/27/19 1749 12/27/19 1749 12/27/19 1749   98 4 °F (36 9 °C) 100 20 122/72 100 %      Temp src Heart Rate Source Patient Position - Orthostatic VS BP Location FiO2 (%)   -- -- 12/27/19 2300 12/27/19 2300 --     Lying Left arm       Pain Score       12/27/19 1749       No Pain           Vitals:    12/27/19 1749 12/27/19 2300 12/27/19 2312   BP: 122/72 (!) 96/47 106/56   Pulse: 100 78    Patient Position - Orthostatic VS:  Lying Lying         Visual Acuity      ED Medications  Medications   haloperidol lactate (HALDOL) injection 5 mg (5 mg Intramuscular Given 12/27/19 1800)   LORazepam (ATIVAN) 2 mg/mL injection 2 mg (2 mg Intramuscular Given 12/27/19 1759) Diagnostic Studies  Results Reviewed     Procedure Component Value Units Date/Time    POCT pregnancy, urine [[id number]]  (Normal) Resulted:  12/27/19 1840    Lab Status:  Final result Updated:  12/27/19 1840     EXT PREG TEST UR (Ref: Negative) negative     Control valid    Comprehensive metabolic panel [[id number]]  (Abnormal) Collected:  12/27/19 1807    Lab Status:  Final result Specimen:  Blood from Hand, Left Updated:  12/27/19 1829     Sodium 142 mmol/L      Potassium 3 6 mmol/L      Chloride 106 mmol/L      CO2 20 mmol/L      ANION GAP 16 mmol/L      BUN 5 mg/dL      Creatinine 0 78 mg/dL      Glucose 76 mg/dL      Calcium 8 4 mg/dL      AST 22 U/L      ALT 15 U/L      Alkaline Phosphatase 61 U/L      Total Protein 8 2 g/dL      Albumin 4 1 g/dL      Total Bilirubin 0 20 mg/dL      eGFR 103 ml/min/1 73sq m     Narrative:       [place] guidelines for Chronic Kidney Disease (CKD):     Stage 1 with normal or high GFR (GFR > 90 mL/min/1 73 square meters)    Stage 2 Mild CKD (GFR = 60-89 mL/min/1 73 square meters)    Stage 3A Moderate CKD (GFR = 45-59 mL/min/1 73 square meters)    Stage 3B Moderate CKD (GFR = 30-44 mL/min/1 73 square meters)    Stage 4 Severe CKD (GFR = 15-29 mL/min/1 73 square meters)    Stage 5 End Stage CKD (GFR <15 mL/min/1 73 square meters)  Note: GFR calculation is accurate only with a steady state creatinine    Rapid drug screen, urine [[id number]]  (Abnormal) Collected:  12/27/19 1801    Lab Status:  Final result Specimen:  Urine, Other Updated:  12/27/19 1826     Amph/Meth UR Negative     Barbiturate Ur Negative     Benzodiazepine Urine Negative     Cocaine Urine Negative     Methadone Urine Negative     Opiate Urine Negative     PCP Ur Negative     THC Urine Positive    Narrative:       Presumptive report  If requested, specimen will be sent to reference lab for confirmation  FOR MEDICAL PURPOSES ONLY     IF CONFIRMATION NEEDED PLEASE CONTACT THE LAB WITHIN 5 DAYS      Drug Screen Cutoff Levels:  AMPHETAMINE/METHAMPHETAMINES  1000 ng/mL  BARBITURATES     200 ng/mL  BENZODIAZEPINES     200 ng/mL  COCAINE      300 ng/mL  METHADONE      300 ng/mL  OPIATES      300 ng/mL  PHENCYCLIDINE     25 ng/mL  THC       50 ng/mL      Ethanol [[id number]]  (Abnormal) Collected:  12/27/19 1807    Lab Status:  Final result Specimen:  Blood from Hand, Left Updated:  12/27/19 1825     Ethanol Lvl 171 mg/dL     Urine Microscopic [[id number]]  (Abnormal) Collected:  12/27/19 1801    Lab Status:  Final result Specimen:  Urine, Other Updated:  12/27/19 1820     RBC, UA 1-2 /hpf      WBC, UA None Seen /hpf      Epithelial Cells Moderate /hpf      Bacteria, UA Occasional /hpf     CBC and differential [[id number]]  (Abnormal) Collected:  12/27/19 1807    Lab Status:  Final result Specimen:  Blood from Hand, Left Updated:  12/27/19 1812     WBC 8 63 Thousand/uL      RBC 4 56 Million/uL      Hemoglobin 13 8 g/dL      Hematocrit 41 9 %      MCV 92 fL      MCH 30 3 pg      MCHC 32 9 g/dL      RDW 15 7 %      MPV 8 6 fL      Platelets 528 Thousands/uL      nRBC 0 /100 WBCs      Neutrophils Relative 55 %      Immat GRANS % 0 %      Lymphocytes Relative 38 %      Monocytes Relative 6 %      Eosinophils Relative 0 %      Basophils Relative 1 %      Neutrophils Absolute 4 74 Thousands/µL      Immature Grans Absolute 0 03 Thousand/uL      Lymphocytes Absolute 3 25 Thousands/µL      Monocytes Absolute 0 54 Thousand/µL      Eosinophils Absolute 0 03 Thousand/µL      Basophils Absolute 0 04 Thousands/µL     UA (URINE) with reflex to Scope [[id number]]  (Abnormal) Collected:  12/27/19 1801    Lab Status:  Final result Specimen:  Urine, Other Updated:  12/27/19 1810     Color, UA Light Yellow     Clarity, UA Clear     Specific Gravity, UA <=1 005     pH, UA 6 0     Leukocytes, UA Negative     Nitrite, UA Negative     Protein, UA Negative mg/dl      Glucose, UA Negative mg/dl      Ketones, UA Negative mg/dl Urobilinogen, UA 0 2 E U /dl      Bilirubin, UA Negative     Blood, UA Large                 No orders to display              Procedures  Procedures         ED Course  ED Course as of Dec 28 0120   Fri Dec 27, 2019   2020 Pt  Sedated but responsive to stimuli  Will start withdrawing restraints  MDM  Number of Diagnoses or Management Options  Alcohol intoxication [facility]):   Diagnosis management comments: 2125 - pt  Is medically cleared for BHU evaluation  Labs and urine results reviewed  0120 - signed out to night doctor pending family guidance evaluation  Disposition  Final diagnoses:   Alcohol intoxication ([address] )     Time reflects when diagnosis was documented in both MDM as applicable and the Disposition within this note     Time User Action Codes Description Comment    58/77/9524  1:39 PM Kristofer BRYANT Add [L53 751] Alcohol intoxication [facility])       ED Disposition     None      Follow-up Information    None         Patient's Medications   Discharge Prescriptions    No medications on file     No discharge procedures on file      ED Provider  Electronically Signed by           Dominic Roe MD  75/73/45 5581

## 2019-12-28 NOTE — ED NOTES
7:20 - Called [facility] and spoke with Fina as he saw pt this morning prior to this writer's arrival   Martha Quezada stated that he was on the other line with the pt's father collecting collateral information  He will call back  8:10 - Fina called back  He stated that he is going to be creating a screening document on the pt due to her history of impulsive and explosive behavior  Pt has a history of suicide attempts (2 hanging attempts and 1 overdose )  Fina will had next shift update PES      Arlene Goltz, MA  Crisis Intervention Worker  12/28/19 8:28 AM

## 2019-12-28 NOTE — ED NOTES
9:45 - Pt asked to speak with this writer  Pt asking if she can provide contact information for her boyfriend, Abi Burgos ([phone] )  Pt asked if Jose Raul Mei could talk to him so "he can tell them I don't need inpatient "  This writer will pass along information  10:01 - Fina called to report that he was faxing over the screening document for pt      Contreras Abbott MA  Crisis Intervention Worker  12/28/19 10:16 AM

## 2019-12-28 NOTE — ED NOTES
Louise Pickard brought in for pt, did not want at this time   Wants to save it for later     Susanne Francois, DEANGELO  12/28/19 7153

## 2019-12-29 NOTE — ED NOTES
Pt resting at this time  1:1 continues  Kaia Morton working on paper work for transfer  Estimated  time 2000        Giuseppe Morley RN  12/28/19 9276

## 2024-04-15 NOTE — ED CARE HANDOFF
Emergency Department Sign Out Note        Sign out and transfer of care from previous provider  See Separate Emergency Department note  The patient, Oneyda Hester, was evaluated by the previous provider for polysubstance abuse, suicide ideation  Workup Completed:  Medical clearance workup    ED Course / Workup Pending (followup):                            ED Course as of Mar 12 1651   Tue Mar 12, 2019   0705 Voluntary and awaiting bed search  Procedures  MDM  Number of Diagnoses or Management Options  Bipolar disorder [facility]): new and requires workup  Overdose: new and requires workup  Polysubstance abuse [facility]): new and requires workup  Suicidal ideations: new and requires workup     Amount and/or Complexity of Data Reviewed  Clinical lab tests: reviewed  Tests in the medicine section of CPT®: reviewed and ordered    Risk of Complications, Morbidity, and/or Mortality  General comments: Patient was medically cleared by previous provider  Patient started on her home medication during my shift  Patient is voluntary and has been accepted to [facility] By Dr Jan RHOADES completed  Patient currently awaiting transfer  Care patient signed out to the next attending will continue to monitor patient until she is transferred  Patient has been stable during my shift      Patient Progress  Patient progress: stable      Disposition  Final diagnoses:   Suicidal ideations   Overdose   Bipolar disorder ([facility] [address] )   Polysubstance abuse ([facility] [address] )     Time reflects when diagnosis was documented in both MDM as applicable and the Disposition within this note     Time User Action Codes Description Comment    3/11/2019 10:33 PM Elfreda Georgis Add [L75 859] Suicidal ideations     3/11/2019 10:33 PM Elfreda Georgis Add Hulon Ards Overdose     3/12/2019  4:43 PM Tod Sings Add [F31 9] Bipolar disorder ([facility] [address] )     3/12/2019  4:43 PM Tod Sings Add [F19 10] Polysubstance abuse [facility])       ED Disposition ED Disposition Condition Date/Time Comment    Transfer to [address] [place] [address] Mar 12, 2019  4:44 PM Rupali Hurtado should be transferred out to [facility] under Dr Francisco J Mcdaniels and has been medically cleared  MD Documentation      Most Recent Value   Patient Condition  The patient has been stabilized such that within reasonable medical probability, no material deterioration of the patient condition or the condition of the unborn child(alberto) is likely to result from the transfer   Reason for Transfer  Level of Care needed not available at this facility   Benefits of Transfer  Specialized equipment and/or services available at the receiving facility (Include comment)________________________   Risks of Transfer  Potential for delay in receiving treatment, Potential deterioration of medical condition, Increased discomfort during transfer, Possible worsening of condition or death during transfer   Accepting Physician  Dr Nita Galicia Name, [facility]   Sending MD Philomena Ramos, DO   Provider Certification  General risk, such as traffic hazards, adverse weather conditions, rough terrain or turbulence, possible failure of equipment (including vehicle or aircraft), or consequences of actions of persons outside the control of the transport personnel, Unanticipated needs of medical equipment and personnel during transport, Risk of worsening condition, The possibility of a transport vehicle being unavailable, The patient is stable for psychiatric transfer because they are medically stable, and is protected from harming him/herself or others during transport      RN Documentation      Most Recent Value   27 Merari Cummings Name, [facility]      Follow-up Information    None       Patient's Medications   Discharge Prescriptions    No medications on file     No discharge procedures on file         ED Provider  Electronically Signed by Robin Hall DO  03/12/19 9162 15-Apr-2024 13:37